# Patient Record
Sex: MALE | Race: WHITE | Employment: OTHER | URBAN - NONMETROPOLITAN AREA
[De-identification: names, ages, dates, MRNs, and addresses within clinical notes are randomized per-mention and may not be internally consistent; named-entity substitution may affect disease eponyms.]

---

## 2018-09-21 ENCOUNTER — HOSPITAL ENCOUNTER (OUTPATIENT)
Dept: LAB | Age: 23
Setting detail: SPECIMEN
Discharge: HOME | End: 2018-09-21

## 2018-09-21 ENCOUNTER — OFFICE VISIT (OUTPATIENT)
Dept: PRIMARY CARE CLINIC | Age: 23
End: 2018-09-21

## 2018-09-21 VITALS
WEIGHT: 139.6 LBS | TEMPERATURE: 97.5 F | HEIGHT: 65 IN | RESPIRATION RATE: 18 BRPM | OXYGEN SATURATION: 98 % | SYSTOLIC BLOOD PRESSURE: 118 MMHG | HEART RATE: 76 BPM | BODY MASS INDEX: 23.26 KG/M2 | DIASTOLIC BLOOD PRESSURE: 70 MMHG

## 2018-09-21 DIAGNOSIS — E04.9 ENLARGED THYROID: ICD-10-CM

## 2018-09-21 DIAGNOSIS — E04.9 ENLARGED THYROID: Primary | ICD-10-CM

## 2018-09-21 LAB — TSH SERPL DL<=0.05 MIU/L-ACNC: 1.05 MIU/L (ref 0.3–5)

## 2018-09-21 PROCEDURE — 36415 COLL VENOUS BLD VENIPUNCTURE: CPT

## 2018-09-21 PROCEDURE — 84443 ASSAY THYROID STIM HORMONE: CPT

## 2018-09-21 PROCEDURE — 99203 OFFICE O/P NEW LOW 30 MIN: CPT | Performed by: FAMILY MEDICINE

## 2018-09-21 ASSESSMENT — ENCOUNTER SYMPTOMS
DIARRHEA: 1
WHEEZING: 0
CHEST TIGHTNESS: 0
COUGH: 0
SHORTNESS OF BREATH: 0
BLOOD IN STOOL: 0

## 2018-09-21 NOTE — PROGRESS NOTES
[address] [place] [address] [place]  Dept: [phone]  Dept Fax: [phone]  Loc: [phone]    Jessee Rosenberg is a [employer] y.o. male who presents today for his medical conditions/complaints as noted below. Jessee Rosenberg is c/o of   Chief Complaint   Patient presents with    Thyroid Problem     stated about a weekago, unable to sleep, throat swelled up       HPI:     HPI Here today for concerns about his thyroid. He has been having issues with fatigue over the past 10 days. He reports that he has been working himself too hard and now \"his body is shutting down\". He has also been having issues with feeling like he couldn't eat or sleep. He lost 10 pounds in a week and his hair was falling out. His mom is worried that he could have hyperthyroidism because she has inna's disease. He was having some issues with heat intolerance and he was having any issues with palpitations as well. Past Medical History:   Diagnosis Date    Acne     Allergic rhinitis     Anterior cruciate ligament tear     Left knee.  Metacarpal bone fracture 2007    Right 3rd.  Traumatic injury 2009    Right arm laceration, median nerve laceration, brachial artery laceration with multiple surgeries. Social History   Substance Use Topics    Smoking status: Never Smoker    Smokeless tobacco: Never Used    Alcohol use No      No current outpatient prescriptions on file. No current facility-administered medications for this visit. Allergies   Allergen Reactions    Bee Venom      Stings. Uses EpiPen. Subjective:      Review of Systems   Constitutional: Positive for appetite change, fatigue and unexpected weight change (10 pound weight loss). Negative for activity change, chills and fever. Eyes: Negative for visual disturbance. Respiratory: Negative for cough, chest tightness, shortness of breath and wheezing. Cardiovascular: Positive for palpitations. Negative for chest pain and leg swelling. Gastrointestinal: Positive for diarrhea. Negative for blood in stool. Endocrine: Positive for heat intolerance. Genitourinary: Negative for difficulty urinating. Skin: Negative for rash (some that are coming and going). Neurological: Negative for dizziness, syncope, weakness, light-headedness and headaches. Psychiatric/Behavioral: Positive for agitation. The patient is nervous/anxious. Objective:     Physical Exam   Constitutional: He is oriented to person, place, and time. He appears well-developed and well-nourished. No distress. Eyes: Pupils are equal, round, and reactive to light. Conjunctivae and EOM are normal.   Neck: Normal range of motion. Neck supple. Thyromegaly present. Cardiovascular: Normal rate, regular rhythm, normal heart sounds and intact distal pulses. No murmur heard. Pulmonary/Chest: Effort normal and breath sounds normal. No respiratory distress. He has no wheezes. He has no rales. Musculoskeletal: Normal range of motion. He exhibits no edema. Lymphadenopathy:     He has no cervical adenopathy. Neurological: He is alert and oriented to person, place, and time. Skin: Skin is warm and dry. No rash noted. Psychiatric: His mood appears anxious. His speech is rapid and/or pressured. He is agitated. He expresses impulsivity. Nursing note and vitals reviewed. /70   Pulse 76   Temp 97.5 °F (36.4 °C)   Resp 18   Ht 5' 5\" (1.651 m)   Wt 139 lb 9.6 oz (63.3 kg)   SpO2 98%   BMI 23.23 kg/m²     Assessment:       Diagnosis Orders   1. Enlarged thyroid  TSH With Reflex Ft4             Plan:       Enlarged thyroid: new; I recommended checking a TSH which he is hesitant to do because he does not have insurance. I also recommended a possible ultrasound which he refused. He informed me that he also will not take prescription medication and he plans to treat this naturally if his thyroid is off in some way.  I advised

## 2018-09-27 ENCOUNTER — OFFICE VISIT (OUTPATIENT)
Dept: FAMILY MEDICINE CLINIC | Age: 23
End: 2018-09-27

## 2018-09-27 VITALS
HEIGHT: 66 IN | BODY MASS INDEX: 22.37 KG/M2 | TEMPERATURE: 97 F | WEIGHT: 139.2 LBS | HEART RATE: 73 BPM | DIASTOLIC BLOOD PRESSURE: 82 MMHG | OXYGEN SATURATION: 98 % | SYSTOLIC BLOOD PRESSURE: 120 MMHG

## 2018-09-27 DIAGNOSIS — G89.29 CHRONIC UPPER BACK PAIN: Primary | ICD-10-CM

## 2018-09-27 DIAGNOSIS — M54.9 CHRONIC UPPER BACK PAIN: Primary | ICD-10-CM

## 2018-09-27 DIAGNOSIS — J30.1 SEASONAL ALLERGIC RHINITIS DUE TO POLLEN: ICD-10-CM

## 2018-09-27 PROCEDURE — 99214 OFFICE O/P EST MOD 30 MIN: CPT | Performed by: FAMILY MEDICINE

## 2018-09-27 RX ORDER — BACLOFEN 10 MG/1
10 TABLET ORAL NIGHTLY PRN
Qty: 30 TABLET | Refills: 1 | Status: ON HOLD | OUTPATIENT
Start: 2018-09-27 | End: 2018-11-13 | Stop reason: HOSPADM

## 2018-09-27 ASSESSMENT — ENCOUNTER SYMPTOMS
SHORTNESS OF BREATH: 0
ABDOMINAL PAIN: 0
CHEST TIGHTNESS: 0
BOWEL INCONTINENCE: 0
COUGH: 0
WHEEZING: 0
NAUSEA: 0
DIARRHEA: 0
CONSTIPATION: 0
BACK PAIN: 1

## 2018-09-27 ASSESSMENT — PATIENT HEALTH QUESTIONNAIRE - PHQ9
SUM OF ALL RESPONSES TO PHQ9 QUESTIONS 1 & 2: 0
SUM OF ALL RESPONSES TO PHQ QUESTIONS 1-9: 0
SUM OF ALL RESPONSES TO PHQ QUESTIONS 1-9: 0
2. FEELING DOWN, DEPRESSED OR HOPELESS: 0
1. LITTLE INTEREST OR PLEASURE IN DOING THINGS: 0

## 2018-09-27 NOTE — PROGRESS NOTES
[address] [facility] [place]  Dept: [phone]  Dept Fax: [phone]  Loc: [phone]    Jessee Rosenberg is a [facility] y.o. male who presents today for his medical conditions/complaints as noted below. Jessee Rosenberg is c/o of   Chief Complaint   Patient presents with   [facility] Established New Doctor    Back Pain     upper back- right shoulder - underneath the shoulder blade     Allergies     seasonal        HPI:     Here today for back pain and seasonal allergies. Back Pain   This is a chronic problem. The current episode started more than 1 year ago (2009 after a really bad accident). The problem occurs constantly. The problem is unchanged. The pain is present in the thoracic spine. The quality of the pain is described as shooting and stabbing. The pain does not radiate. Associated symptoms include tingling (right arm due to traumatic injury) and weakness (in right wrist and hand). Pertinent negatives include no abdominal pain, bladder incontinence, bowel incontinence, chest pain, dysuria, fever, headaches, numbness or paresthesias. He has tried home exercises and NSAIDs (massage) for the symptoms. The treatment provided mild relief. He has tried icy hot, he has never tried a TENS unit. His back feels \"hot\" constantly. Allergies: worsening; he has issues with sinus congestion, cough and runny nose in the spring and fall. He uses claritin and zyrtec with only mild improvement. He has also used flonase with significant improvement. He does not use any of these regularly. Past Medical History:   Diagnosis Date    Acne     Allergic rhinitis     Anterior cruciate ligament tear     Left knee.  Metacarpal bone fracture 2007    Right 3rd.  Traumatic injury 2009    Right arm laceration, median nerve laceration, brachial artery laceration with multiple surgeries.           Social History   Substance Use Topics    Smoking status: Never Smoker    Smokeless tobacco: Never Used    Alcohol use No      Current Outpatient Prescriptions   Medication Sig Dispense Refill    baclofen (LIORESAL) 10 MG tablet Take 1 tablet by mouth nightly as needed (muscle spasm) 30 tablet 1     No current facility-administered medications for this visit. Allergies   Allergen Reactions    Bee Venom      Stings. Uses EpiPen. Subjective:      Review of Systems   Constitutional: Negative for activity change, appetite change, chills, fatigue and fever. HENT: Positive for congestion. Eyes: Negative for visual disturbance. Respiratory: Negative for cough, chest tightness, shortness of breath and wheezing. Cardiovascular: Negative for chest pain, palpitations and leg swelling. Gastrointestinal: Negative for abdominal pain, bowel incontinence, constipation, diarrhea and nausea. Genitourinary: Negative for bladder incontinence, difficulty urinating and dysuria. Musculoskeletal: Positive for back pain. Negative for arthralgias, gait problem, joint swelling, neck pain and neck stiffness. Skin: Negative for rash. Allergic/Immunologic: Positive for environmental allergies. Neurological: Positive for tingling (right arm due to traumatic injury) and weakness (in right wrist and hand). Negative for dizziness, syncope, light-headedness, numbness, headaches and paresthesias. Objective:     Physical Exam   Constitutional: He is oriented to person, place, and time. He appears well-developed and well-nourished. HENT:   Right Ear: Tympanic membrane, external ear and ear canal normal.   Left Ear: Tympanic membrane, external ear and ear canal normal.   Nose: Mucosal edema present. Right sinus exhibits no maxillary sinus tenderness and no frontal sinus tenderness. Left sinus exhibits no maxillary sinus tenderness and no frontal sinus tenderness. Mouth/Throat: Posterior oropharyngeal erythema present. Eyes: Pupils are equal, round, and reactive to light. Conjunctivae and EOM are normal.   Neck: Normal range of motion. Neck supple. Thyromegaly present. Cardiovascular: Normal rate, regular rhythm, normal heart sounds and intact distal pulses. No murmur heard. Pulmonary/Chest: Effort normal and breath sounds normal. No respiratory distress. He has no wheezes. Musculoskeletal:        Thoracic back: He exhibits spasm (significant spasm of trapezius muscles and paraspinous muscles). He exhibits normal range of motion, no tenderness, no bony tenderness and no deformity. Lymphadenopathy:     He has no cervical adenopathy. Neurological: He is alert and oriented to person, place, and time. Reflex Scores:       Bicep reflexes are 1+ on the right side and 2+ on the left side. Strength is normal in upper arms, but significantly decreased strength in the right hand and wrist   Skin: Skin is warm and dry. No rash noted. No erythema. Nursing note and vitals reviewed. /82 (Site: Left Upper Arm, Position: Sitting, Cuff Size: Medium Adult)   Pulse 73   Temp 97 °F (36.1 °C) (Tympanic)   Ht 5' 6\" (1.676 m)   Wt 139 lb 3.2 oz (63.1 kg)   SpO2 98%   BMI 22.47 kg/m²     Assessment:       Diagnosis Orders   1. Chronic upper back pain  [facility] [place]   2. Seasonal allergic rhinitis due to pollen               Plan:       Upper back pain: new; I explained that I do not like to treat back pain with narcotics. I recommended NSAIDs, heat, ice and back exercises. he was given back exercises to take home. I also recommended an occasional muscle relaxer at bedtime if he needs it. I also talked about the importance of good posture and staying active. I think he would really benefit from PT so I put in a referral and I encouraged him to discuss cost with them. I also suggested an OTC TENS unit. Allergies: worsening; I recommended he take flonase with his antihistamine to help control his symptoms.       Return if symptoms worsen or fail to improve. Orders Placed This Encounter   Procedures    [facility] [place]     Referral Priority:   Routine     Referral Type:   Eval and Treat     Referral Reason:   Specialty Services Required     Requested Specialty:   Physical Therapy     Number of Visits Requested:   1     Orders Placed This Encounter   Medications    baclofen (LIORESAL) 10 MG tablet     Sig: Take 1 tablet by mouth nightly as needed (muscle spasm)     Dispense:  30 tablet     Refill:  1       Patient given educational materials - see patient instructions. Discussed use, benefit, and side effects of prescribed medications. All patient questions answered. Pt voiced understanding. Reviewed health maintenance. Instructed to continue current medications, diet and exercise. Patient agreed with treatment plan. Follow up as directed.      Electronically signed by Vonda Cedeno MD on 9/27/2018 at 5:11 PM

## 2018-11-09 ENCOUNTER — HOSPITAL ENCOUNTER (EMERGENCY)
Age: 23
Discharge: PSYCHIATRIC OR SUBSTANCE USE FACILITY | End: 2018-11-10
Attending: EMERGENCY MEDICINE

## 2018-11-09 DIAGNOSIS — F29 PSYCHOSIS, UNSPECIFIED PSYCHOSIS TYPE (HCC): Primary | ICD-10-CM

## 2018-11-09 LAB
-: ABNORMAL
ABSOLUTE EOS #: 0 K/UL (ref 0–0.4)
ABSOLUTE IMMATURE GRANULOCYTE: ABNORMAL K/UL (ref 0–0.3)
ABSOLUTE LYMPH #: 1.4 K/UL (ref 1–4.8)
ABSOLUTE MONO #: 0.9 K/UL (ref 0.1–1.2)
ACETAMINOPHEN LEVEL: <5 UG/ML (ref 10–30)
AMORPHOUS: ABNORMAL
AMPHETAMINE SCREEN URINE: NEGATIVE
ANION GAP SERPL CALCULATED.3IONS-SCNC: 21 MMOL/L (ref 9–17)
BACTERIA: ABNORMAL
BARBITURATE SCREEN URINE: NEGATIVE
BASOPHILS # BLD: 0 % (ref 0–2)
BASOPHILS ABSOLUTE: 0 K/UL (ref 0–0.2)
BENZODIAZEPINE SCREEN, URINE: NEGATIVE
BILIRUBIN URINE: ABNORMAL
BUN BLDV-MCNC: 20 MG/DL (ref 6–20)
BUN/CREAT BLD: 18 (ref 9–20)
BUPRENORPHINE URINE: NEGATIVE
CALCIUM SERPL-MCNC: 9.5 MG/DL (ref 8.6–10.4)
CANNABINOID SCREEN URINE: POSITIVE
CASTS UA: ABNORMAL /LPF (ref 0–2)
CHLORIDE BLD-SCNC: 98 MMOL/L (ref 98–107)
CO2: 21 MMOL/L (ref 20–31)
COCAINE METABOLITE, URINE: NEGATIVE
COLOR: ABNORMAL
COMMENT UA: ABNORMAL
CREAT SERPL-MCNC: 1.13 MG/DL (ref 0.7–1.2)
CRYSTALS, UA: ABNORMAL /HPF
DIFFERENTIAL TYPE: ABNORMAL
EOSINOPHILS RELATIVE PERCENT: 0 % (ref 1–8)
EPITHELIAL CELLS UA: ABNORMAL /HPF (ref 0–5)
ETHANOL PERCENT: NORMAL %
ETHANOL: <10 MG/DL
GFR AFRICAN AMERICAN: >60 ML/MIN
GFR NON-AFRICAN AMERICAN: >60 ML/MIN
GFR SERPL CREATININE-BSD FRML MDRD: ABNORMAL ML/MIN/{1.73_M2}
GFR SERPL CREATININE-BSD FRML MDRD: ABNORMAL ML/MIN/{1.73_M2}
GLUCOSE BLD-MCNC: 99 MG/DL (ref 70–99)
GLUCOSE URINE: NEGATIVE
HCT VFR BLD CALC: 49.1 % (ref 41–53)
HEMOGLOBIN: 16.2 G/DL (ref 13.5–17.5)
IMMATURE GRANULOCYTES: ABNORMAL %
KETONES, URINE: ABNORMAL
LEUKOCYTE ESTERASE, URINE: NEGATIVE
LYMPHOCYTES # BLD: 13 % (ref 15–43)
MCH RBC QN AUTO: 28.9 PG (ref 26–34)
MCHC RBC AUTO-ENTMCNC: 33.1 G/DL (ref 31–37)
MCV RBC AUTO: 87.3 FL (ref 80–100)
MDMA URINE: ABNORMAL
METHADONE SCREEN, URINE: NEGATIVE
METHAMPHETAMINE, URINE: NEGATIVE
MONOCYTES # BLD: 9 % (ref 6–14)
MUCUS: ABNORMAL
NITRITE, URINE: NEGATIVE
NRBC AUTOMATED: ABNORMAL PER 100 WBC
OPIATES, URINE: NEGATIVE
OTHER OBSERVATIONS UA: ABNORMAL
OXYCODONE SCREEN URINE: NEGATIVE
PDW BLD-RTO: 15.4 % (ref 11–14.5)
PH UA: 6 (ref 5–6)
PHENCYCLIDINE, URINE: NEGATIVE
PLATELET # BLD: 217 K/UL (ref 140–450)
PLATELET ESTIMATE: ABNORMAL
PMV BLD AUTO: 9.4 FL (ref 6–12)
POTASSIUM SERPL-SCNC: 3.6 MMOL/L (ref 3.7–5.3)
PROPOXYPHENE, URINE: NEGATIVE
PROTEIN UA: ABNORMAL
RBC # BLD: 5.63 M/UL (ref 4.5–5.9)
RBC # BLD: ABNORMAL 10*6/UL
RBC UA: ABNORMAL /HPF (ref 0–4)
RENAL EPITHELIAL, UA: ABNORMAL /HPF
SALICYLATE LEVEL: <1 MG/DL (ref 3–10)
SEG NEUTROPHILS: 78 % (ref 44–74)
SEGMENTED NEUTROPHILS ABSOLUTE COUNT: 8.3 K/UL (ref 1.8–7.7)
SODIUM BLD-SCNC: 140 MMOL/L (ref 135–144)
SPECIFIC GRAVITY UA: 1.03 (ref 1.01–1.02)
TEST INFORMATION: ABNORMAL
TRICHOMONAS: ABNORMAL
TRICYCLIC ANTIDEPRESSANTS, UR: NEGATIVE
TURBIDITY: ABNORMAL
URINE HGB: NEGATIVE
UROBILINOGEN, URINE: NORMAL
WBC # BLD: 10.7 K/UL (ref 3.5–11)
WBC # BLD: ABNORMAL 10*3/UL
WBC UA: ABNORMAL /HPF (ref 0–4)
YEAST: ABNORMAL

## 2018-11-09 PROCEDURE — 80307 DRUG TEST PRSMV CHEM ANLYZR: CPT

## 2018-11-09 PROCEDURE — 81001 URINALYSIS AUTO W/SCOPE: CPT

## 2018-11-09 PROCEDURE — 85025 COMPLETE CBC W/AUTO DIFF WBC: CPT

## 2018-11-09 PROCEDURE — 36415 COLL VENOUS BLD VENIPUNCTURE: CPT

## 2018-11-09 PROCEDURE — 99285 EMERGENCY DEPT VISIT HI MDM: CPT

## 2018-11-09 PROCEDURE — 80048 BASIC METABOLIC PNL TOTAL CA: CPT

## 2018-11-09 PROCEDURE — G0480 DRUG TEST DEF 1-7 CLASSES: HCPCS

## 2018-11-09 PROCEDURE — 80306 DRUG TEST PRSMV INSTRMNT: CPT

## 2018-11-10 ENCOUNTER — HOSPITAL ENCOUNTER (INPATIENT)
Age: 23
LOS: 3 days | Discharge: HOME | DRG: 885 | End: 2018-11-13
Attending: PSYCHIATRY & NEUROLOGY | Admitting: PSYCHIATRY & NEUROLOGY

## 2018-11-10 VITALS
SYSTOLIC BLOOD PRESSURE: 149 MMHG | OXYGEN SATURATION: 100 % | HEART RATE: 81 BPM | DIASTOLIC BLOOD PRESSURE: 81 MMHG | BODY MASS INDEX: 23.32 KG/M2 | WEIGHT: 140 LBS | TEMPERATURE: 98.7 F | HEIGHT: 65 IN | RESPIRATION RATE: 16 BRPM

## 2018-11-10 PROBLEM — F12.20 CANNABIS DEPENDENCE (HCC): Status: ACTIVE | Noted: 2018-11-10

## 2018-11-10 PROBLEM — F31.11 BIPOLAR 1 DISORDER, MANIC, MILD (HCC): Status: ACTIVE | Noted: 2018-11-10

## 2018-11-10 PROBLEM — F31.2 SEVERE MANIC BIPOLAR 1 DISORDER WITH PSYCHOTIC BEHAVIOR (HCC): Status: ACTIVE | Noted: 2018-11-10

## 2018-11-10 PROCEDURE — 6370000000 HC RX 637 (ALT 250 FOR IP): Performed by: PSYCHIATRY & NEUROLOGY

## 2018-11-10 PROCEDURE — 6370000000 HC RX 637 (ALT 250 FOR IP): Performed by: NURSE PRACTITIONER

## 2018-11-10 PROCEDURE — 1240000000 HC EMOTIONAL WELLNESS R&B

## 2018-11-10 PROCEDURE — 6360000002 HC RX W HCPCS: Performed by: NURSE PRACTITIONER

## 2018-11-10 PROCEDURE — 6360000002 HC RX W HCPCS: Performed by: PSYCHIATRY & NEUROLOGY

## 2018-11-10 PROCEDURE — 80076 HEPATIC FUNCTION PANEL: CPT

## 2018-11-10 PROCEDURE — 90792 PSYCH DIAG EVAL W/MED SRVCS: CPT | Performed by: NURSE PRACTITIONER

## 2018-11-10 RX ORDER — MAGNESIUM HYDROXIDE/ALUMINUM HYDROXICE/SIMETHICONE 120; 1200; 1200 MG/30ML; MG/30ML; MG/30ML
30 SUSPENSION ORAL EVERY 6 HOURS PRN
Status: DISCONTINUED | OUTPATIENT
Start: 2018-11-10 | End: 2018-11-13 | Stop reason: HOSPADM

## 2018-11-10 RX ORDER — HALOPERIDOL 5 MG/ML
5 INJECTION INTRAMUSCULAR EVERY 6 HOURS PRN
Status: DISCONTINUED | OUTPATIENT
Start: 2018-11-10 | End: 2018-11-13 | Stop reason: HOSPADM

## 2018-11-10 RX ORDER — DIVALPROEX SODIUM 500 MG/1
1000 TABLET, EXTENDED RELEASE ORAL DAILY
Status: DISCONTINUED | OUTPATIENT
Start: 2018-11-10 | End: 2018-11-13 | Stop reason: HOSPADM

## 2018-11-10 RX ORDER — DIPHENHYDRAMINE HYDROCHLORIDE 50 MG/ML
50 INJECTION INTRAMUSCULAR; INTRAVENOUS EVERY 6 HOURS PRN
Status: DISCONTINUED | OUTPATIENT
Start: 2018-11-10 | End: 2018-11-13 | Stop reason: HOSPADM

## 2018-11-10 RX ORDER — BENZTROPINE MESYLATE 1 MG/ML
2 INJECTION INTRAMUSCULAR; INTRAVENOUS 2 TIMES DAILY PRN
Status: DISCONTINUED | OUTPATIENT
Start: 2018-11-10 | End: 2018-11-13 | Stop reason: HOSPADM

## 2018-11-10 RX ORDER — HALOPERIDOL 5 MG/ML
5 INJECTION INTRAMUSCULAR EVERY 6 HOURS PRN
Status: DISCONTINUED | OUTPATIENT
Start: 2018-11-10 | End: 2018-11-10

## 2018-11-10 RX ORDER — NICOTINE 21 MG/24HR
1 PATCH, TRANSDERMAL 24 HOURS TRANSDERMAL DAILY
Status: DISCONTINUED | OUTPATIENT
Start: 2018-11-10 | End: 2018-11-12

## 2018-11-10 RX ORDER — TRAZODONE HYDROCHLORIDE 50 MG/1
50 TABLET ORAL NIGHTLY PRN
Status: DISCONTINUED | OUTPATIENT
Start: 2018-11-10 | End: 2018-11-13 | Stop reason: HOSPADM

## 2018-11-10 RX ORDER — HYDROXYZINE 50 MG/1
50 TABLET, FILM COATED ORAL 3 TIMES DAILY PRN
Status: DISCONTINUED | OUTPATIENT
Start: 2018-11-10 | End: 2018-11-13 | Stop reason: HOSPADM

## 2018-11-10 RX ORDER — ACETAMINOPHEN 325 MG/1
650 TABLET ORAL EVERY 4 HOURS PRN
Status: DISCONTINUED | OUTPATIENT
Start: 2018-11-10 | End: 2018-11-13 | Stop reason: HOSPADM

## 2018-11-10 RX ORDER — DIPHENHYDRAMINE HYDROCHLORIDE 50 MG/ML
25 INJECTION INTRAMUSCULAR; INTRAVENOUS EVERY 6 HOURS PRN
Status: DISCONTINUED | OUTPATIENT
Start: 2018-11-10 | End: 2018-11-10

## 2018-11-10 RX ADMIN — HYDROXYZINE HYDROCHLORIDE 50 MG: 50 TABLET, FILM COATED ORAL at 20:18

## 2018-11-10 RX ADMIN — NICOTINE POLACRILEX 2 MG: 2 GUM, CHEWING BUCCAL at 17:55

## 2018-11-10 RX ADMIN — NICOTINE POLACRILEX 2 MG: 2 GUM, CHEWING BUCCAL at 14:51

## 2018-11-10 RX ADMIN — HALOPERIDOL LACTATE 5 MG: 5 INJECTION, SOLUTION INTRAMUSCULAR at 19:32

## 2018-11-10 RX ADMIN — TRAZODONE HYDROCHLORIDE 50 MG: 50 TABLET ORAL at 20:18

## 2018-11-10 RX ADMIN — HYDROXYZINE HYDROCHLORIDE 50 MG: 50 TABLET, FILM COATED ORAL at 14:48

## 2018-11-10 RX ADMIN — HALOPERIDOL LACTATE 5 MG: 5 INJECTION, SOLUTION INTRAMUSCULAR at 12:30

## 2018-11-10 RX ADMIN — DIVALPROEX SODIUM 1000 MG: 500 TABLET, EXTENDED RELEASE ORAL at 14:48

## 2018-11-10 RX ADMIN — DIPHENHYDRAMINE HYDROCHLORIDE 25 MG: 50 INJECTION, SOLUTION INTRAMUSCULAR; INTRAVENOUS at 12:30

## 2018-11-10 RX ADMIN — DIPHENHYDRAMINE HYDROCHLORIDE 50 MG: 50 INJECTION, SOLUTION INTRAMUSCULAR; INTRAVENOUS at 19:33

## 2018-11-10 ASSESSMENT — SLEEP AND FATIGUE QUESTIONNAIRES: DO YOU HAVE DIFFICULTY SLEEPING: YES

## 2018-11-10 ASSESSMENT — LIFESTYLE VARIABLES: HISTORY_ALCOHOL_USE: NO

## 2018-11-10 NOTE — ED NOTES
Patient's mother and sister arrive back to ED. Both are allowed back to the room with patient after getting permission from patient for them to come back to room with him.      Marylin Domínguez, PEDRO  11/09/18 2003

## 2018-11-10 NOTE — ED NOTES
Writer checks on patient. Patient is standing at bedside, writing on the paper he was given.      Narcisa Venegas RN  11/09/18 5667

## 2018-11-10 NOTE — ED PROVIDER NOTES
ADDENDUM:      Care of this patient was assumed from Dr. Areln Chin. The patient was seen for Manic Behavior  . The patient's initial evaluation and plan have been discussed with the prior provider who initially evaluated the patient. Nursing Notes, Past Medical Hx, Past Surgical Hx, Social Hx, Family Hx, Medications and Allergies, and  were all reviewed. Diagnostic Results     EKG   None    RADIOLOGY:  No results found.       LABS:   Results for orders placed or performed during the hospital encounter of 11/09/18   CBC Auto Differential   Result Value Ref Range    WBC 10.7 3.5 - 11.0 k/uL    RBC 5.63 4.5 - 5.9 m/uL    Hemoglobin 16.2 13.5 - 17.5 g/dL    Hematocrit 49.1 41 - 53 %    MCV 87.3 80 - 100 fL    MCH 28.9 26 - 34 pg    MCHC 33.1 31 - 37 g/dL    RDW 15.4 (H) 11.0 - 14.5 %    Platelets 939 957 - 760 k/uL    MPV 9.4 6.0 - 12.0 fL    NRBC Automated NOT REPORTED per 100 WBC    Differential Type NOT REPORTED     Immature Granulocytes NOT REPORTED 0 %    Absolute Immature Granulocyte NOT REPORTED 0.00 - 0.30 k/uL    WBC Morphology NOT REPORTED     RBC Morphology NOT REPORTED     Platelet Estimate NOT REPORTED     Seg Neutrophils 78 (H) 44 - 74 %    Lymphocytes 13 (L) 15 - 43 %    Monocytes 9 6 - 14 %    Eosinophils % 0 (L) 1 - 8 %    Basophils 0 0 - 2 %    Segs Absolute 8.30 (H) 1.8 - 7.7 k/uL    Absolute Lymph # 1.40 1.0 - 4.8 k/uL    Absolute Mono # 0.90 0.1 - 1.2 k/uL    Absolute Eos # 0.00 0.0 - 0.4 k/uL    Basophils # 0.00 0.0 - 0.2 k/uL   Basic Metabolic Panel   Result Value Ref Range    Glucose 99 70 - 99 mg/dL    BUN 20 6 - 20 mg/dL    CREATININE 1.13 0.70 - 1.20 mg/dL    Bun/Cre Ratio 18 9 - 20    Calcium 9.5 8.6 - 10.4 mg/dL    Sodium 140 135 - 144 mmol/L    Potassium 3.6 (L) 3.7 - 5.3 mmol/L    Chloride 98 98 - 107 mmol/L    CO2 21 20 - 31 mmol/L    Anion Gap 21 (H) 9 - 17 mmol/L    GFR Non-African American >60 >60 mL/min    GFR African American >60 >60 mL/min    GFR Comment          GFR Staging NOT REPORTED    Urine Drug Screen   Result Value Ref Range    Amphetamine Screen, Ur NEGATIVE NEG    Barbiturate Screen, Ur NEGATIVE NEG    Benzodiazepine Screen, Urine NEGATIVE NEG    Cocaine Metabolite, Urine NEGATIVE NEG    Methadone Screen, Urine NEGATIVE NEG    Opiates, Urine NEGATIVE NEG    Phencyclidine, Urine NEGATIVE NEG    Propoxyphene, Urine NEGATIVE NEG    Cannabinoid Scrn, Ur POSITIVE (A) NEG    Oxycodone Screen, Ur NEGATIVE NEG    Methamphetamine, Urine NEGATIVE NEG    Tricyclic Antidepressants, Urine NEGATIVE NEG    MDMA, Urine NOT REPORTED NEG    Buprenorphine Urine NEGATIVE NEG    Test Information       The following threshold concentrations are established for the drug assays:   Ethanol   Result Value Ref Range    Ethanol <10 <10 mg/dL    Ethanol percent NOT REPORTED %   Acetaminophen Level   Result Value Ref Range    Acetaminophen Level <5 (L) 10 - 30 ug/mL   Salicylate   Result Value Ref Range    Salicylate Lvl <1 (L) 3 - 10 mg/dL   Urinalysis Reflex to Culture   Result Value Ref Range    Color, UA NOT REPORTED YEL    Turbidity UA NOT REPORTED CLEAR    Glucose, Ur NEGATIVE NEG    Bilirubin Urine 2+ (A) NEG    Ketones, Urine 3+ (A) NEG    Specific Gravity, UA 1.030 (H) 1.010 - 1.025    Urine Hgb NEGATIVE NEG    pH, UA 6.0 5.0 - 6.0    Protein, UA 1+ (A) NEG    Urobilinogen, Urine Normal NORM    Nitrite, Urine NEGATIVE NEG    Leukocyte Esterase, Urine NEGATIVE NEG    Urinalysis Comments NOT REPORTED    Microscopic Urinalysis   Result Value Ref Range    -          WBC, UA 0 TO 4 0 - 4 /HPF    RBC, UA None 0 - 4 /HPF    Casts UA NOT REPORTED 0 - 2 /LPF    Crystals UA NOT REPORTED NONE /HPF    Epithelial Cells UA 0 TO 4 0 - 5 /HPF    Renal Epithelial, Urine NOT REPORTED 0 /HPF    Bacteria, UA TRACE (A) NONE    Mucus, UA 4+ (A) NONE    Trichomonas, UA NOT REPORTED NONE    Amorphous, UA NOT REPORTED NONE    Other Observations UA NOT REPORTED NREQ    Yeast, UA NOT REPORTED NONE       RECENT VITALS:  BP: (!) 159/81, Temp: 98.7 °F (37.1 °C), Pulse: 77, Resp: 15     ED Course     The patient was given the following medications:  No orders of the defined types were placed in this encounter. Medical Decision Making      Case signed out to me by Dr. Nereida Delvalle. The patient presented to the emergency department with family members for concerns of not sleeping and other manic behavior. When I speak with the patient he is unable to clearly answer who he is or where he is. He has very tangential thoughts and flight of ideas. He states that he cannot see the numbers on the clock because there are a lot of background numbers as well. He exhibits features of psychosis and yamila. Other diagnosis to psychiatric conditions however family members reported that he had a similar episode 6 weeks ago that resolved. The patient was placed under a pink slip by Dr. Nereida Delvalle for psychosis. The [facility]  screener agrees with the patient is psychotic and would benefit from psychiatric admission. I discussed the case with Dr. Ivan Dye at 20:17 who was kind enough to accept the patient to his service. There was a lengthy wait and getting a bed assignment as well as ambulance transport. The patient has been agreeable. I have evaluated many times. He is nontoxic appearing in no apparent distress his thoughts are tangential he states that he has not slept in 4 days. He is very cooperative and easily to be redirected when necessary. Disposition     FINAL IMPRESSION      1. Psychosis, unspecified psychosis type [facility])          DISPOSITION/PLAN   DISPOSITION Decision To Transfer 11/09/2018 08:22:36 PM      PATIENT REFERRED TO:  No follow-up provider specified.     DISCHARGE MEDICATIONS:  New Prescriptions    No medications on file             (Please note that portions of this note were completed with a voice recognition program.  Efforts were made to edit the dictations but occasionally words are mis-transcribed.)    Katia Pennington MD, [address]  Attending Emergency Medicine Physician                  Katia Pennington MD  11/10/18 0309      9:82 AM: [place] [employer] have arrived to the emergency department. They are requesting to speak to this patient is seems that he has called 911 multiple times and hung up. He did answer some of their callbacks. [place] back to. Discussing this with the patient right now as he is interfering with 911 resources.      Katia Pennington MD  11/10/18 3879

## 2018-11-10 NOTE — BH NOTE
Psychiatric Admission Note Nurse Practitioner     Soco Drake is a 20 y.o. male who was admitted from the [facility] ER. Patient is acting in a manic way over the past 3-4 days. He has not slept much. He has not making sense according to his friend. He is getting angry very easily according to his mother. This morning he had an episode where he was yelling repeatedly going back and forth asking his mom the question \"is it you , is it me, is it oh, is it me, yes it's me. .\" No psychiatric history however about 6 weeks ago he had a similar manic type episode according to his friend that resolved on its own. Past Psychiatric History   Patient Denies any history of outpt mental health care. Denied history of psychiatric inpatient hospitalizations. Denied history of suicide attempts. History of Substance Abuse     SARAI    Family History of psychiatric disorders    Family history: SARAI SARAI      Medical History   Allergies:  Bee venom   Past Medical History:   Diagnosis Date    Acne     Allergic rhinitis     Anterior cruciate ligament tear     Left knee.  Metacarpal bone fracture 2007    Right 3rd.  Traumatic injury 2009    Right arm laceration, median nerve laceration, brachial artery laceration with multiple surgeries. Past Surgical History:   Procedure Laterality Date    ANTERIOR CRUCIATE LIGAMENT REPAIR Right 01/2016    but retore it again in October 2016    ARM SURGERY Right 11/28/2009    Compartment syndrome right forearm, status post primary closure of fasciotomy right forearm.  ARTERY SURGERY Right 11/25/2009    Exploration right axillary brachial artery, right axillary to brachial artery bypass and right axillary to brachial artery venous bypass with greater saphenous vein.  FASCIOTOMY Right 11/25/2009    Fasciotomy right volar forearm.     NERVE SURGERY Right 11/25/2009    Repair of right median nerve, right ulnar nerve, right musculocutaneous nerve and repair of triceps muscle, Dr. Erinn Oscar, Dr. Juliana St. SOCIAL HISTORY  SARAI      Mental Status  Pt. was alert, hyperverbal, manic, oriented and inappropriate. Appearance and hygiene were age appropriate. Mood was euphoric. Affect was labile, inappropriately animated Thought process was disorganized. Patient denied any hallucinations or paranoia. Patient denied suicidal ideations. Patient denied homicidal ideations . Patient's gross cognitive functions were impaired. Insight and judgement were poor. Both recent and remote memory were impaired. Psychomotor status was without abnormality, agitated and slowed     Diagnostic Impression    Bipolar I, Manic Episode      Medications   nicotine  1 patch Transdermal Daily     hydrOXYzine, traZODone, nicotine polacrilex    Treatment Plan:    1. Admit to inpatient psychiatric treatment  2. Supportive therapy with medication management. Reviewed risks and benefits as well as potential side effects with patient. 3. Therapeutic activities and groups  4.  Follow up at [facility] after symptoms stabilized    Estimated length of stay: 5-7 days    Shayy Maldonado APRN - CNP  Psychiatric Advanced Practice Nurse

## 2018-11-11 LAB
ALBUMIN SERPL-MCNC: 5 G/DL (ref 3.5–5.2)
ALBUMIN/GLOBULIN RATIO: ABNORMAL (ref 1–2.5)
ALP BLD-CCNC: 63 U/L (ref 40–129)
ALT SERPL-CCNC: 82 U/L (ref 5–41)
AST SERPL-CCNC: 207 U/L
BILIRUB SERPL-MCNC: 1.67 MG/DL (ref 0.3–1.2)
BILIRUBIN DIRECT: 0.37 MG/DL
BILIRUBIN, INDIRECT: 1.3 MG/DL (ref 0–1)
CHOLESTEROL/HDL RATIO: 3.3
CHOLESTEROL: 241 MG/DL
ESTIMATED AVERAGE GLUCOSE: 88 MG/DL
GLOBULIN: ABNORMAL G/DL (ref 1.5–3.8)
HBA1C MFR BLD: 4.7 % (ref 4–6)
HDLC SERPL-MCNC: 73 MG/DL
LDL CHOLESTEROL: 143 MG/DL (ref 0–130)
THYROXINE, FREE: 1.92 NG/DL (ref 0.93–1.7)
TOTAL PROTEIN: 7.9 G/DL (ref 6.4–8.3)
TRIGL SERPL-MCNC: 125 MG/DL
TSH SERPL DL<=0.05 MIU/L-ACNC: 1.29 MIU/L (ref 0.3–5)
VLDLC SERPL CALC-MCNC: ABNORMAL MG/DL (ref 1–30)

## 2018-11-11 PROCEDURE — 84443 ASSAY THYROID STIM HORMONE: CPT

## 2018-11-11 PROCEDURE — 6370000000 HC RX 637 (ALT 250 FOR IP): Performed by: PSYCHIATRY & NEUROLOGY

## 2018-11-11 PROCEDURE — 80061 LIPID PANEL: CPT

## 2018-11-11 PROCEDURE — 6370000000 HC RX 637 (ALT 250 FOR IP): Performed by: NURSE PRACTITIONER

## 2018-11-11 PROCEDURE — 83036 HEMOGLOBIN GLYCOSYLATED A1C: CPT

## 2018-11-11 PROCEDURE — 36415 COLL VENOUS BLD VENIPUNCTURE: CPT

## 2018-11-11 PROCEDURE — 1240000000 HC EMOTIONAL WELLNESS R&B

## 2018-11-11 PROCEDURE — 84439 ASSAY OF FREE THYROXINE: CPT

## 2018-11-11 PROCEDURE — 80076 HEPATIC FUNCTION PANEL: CPT

## 2018-11-11 PROCEDURE — 99232 SBSQ HOSP IP/OBS MODERATE 35: CPT | Performed by: PSYCHIATRY & NEUROLOGY

## 2018-11-11 RX ADMIN — NICOTINE POLACRILEX 2 MG: 2 GUM, CHEWING BUCCAL at 14:54

## 2018-11-11 RX ADMIN — HYDROXYZINE HYDROCHLORIDE 50 MG: 50 TABLET, FILM COATED ORAL at 20:51

## 2018-11-11 RX ADMIN — DIVALPROEX SODIUM 1000 MG: 500 TABLET, EXTENDED RELEASE ORAL at 09:05

## 2018-11-11 RX ADMIN — NICOTINE POLACRILEX 2 MG: 2 GUM, CHEWING BUCCAL at 10:54

## 2018-11-11 RX ADMIN — NICOTINE POLACRILEX 2 MG: 2 GUM, CHEWING BUCCAL at 18:10

## 2018-11-11 RX ADMIN — TRAZODONE HYDROCHLORIDE 50 MG: 50 TABLET ORAL at 20:51

## 2018-11-11 RX ADMIN — LURASIDONE HYDROCHLORIDE 40 MG: 40 TABLET, FILM COATED ORAL at 20:51

## 2018-11-11 RX ADMIN — NICOTINE POLACRILEX 2 MG: 2 GUM, CHEWING BUCCAL at 20:51

## 2018-11-11 ASSESSMENT — ENCOUNTER SYMPTOMS
BLOOD IN STOOL: 0
BACK PAIN: 0
SHORTNESS OF BREATH: 0
COUGH: 0
EYE ITCHING: 0
EYE REDNESS: 0
CHEST TIGHTNESS: 0
COLOR CHANGE: 0
SORE THROAT: 0
EYE PAIN: 0
ABDOMINAL PAIN: 0
NAUSEA: 0
RHINORRHEA: 0
DIARRHEA: 0
SINUS PAIN: 0
WHEEZING: 0
VOMITING: 0
CONSTIPATION: 0

## 2018-11-11 NOTE — H&P
HISTORY and [address]       NAME:  Roxann Lux  MRN: [id number]   YOB: 1995   Date: 11/11/2018   Age: 20 y.o. Gender: male     COMPLAINT AND PRESENT HISTORY:      Roxann Lux is a 20 y.o.  male, admitted due to increasing yamila, bizarre behavior over past few weeks. Patient reports he was brought to emergency room by family due to them \"feeling like my behavior is changing\". Per chart review, patient's friend mentioned previous episode months ago where \"patient was paranoid, often spoke in third person, and seemed to have a different personality\". Patient denies thoughts to harm self or others, denies history of previous suicide attempts. Patient reports increased stress recently due to his boss, patient states he works for [employer], he is in management, also [employer] and does a lot for [employer]. In the past few weeks, patient states that sleep has been poor, appetite has been poor, energy level has been very high, focus/concentration has been decreased. Patient denies presence of auditory, visual or tactile hallucinations. Patient does reports history of marijuana and cocaine use in the past, denies current drug or alcohol use, toxicology positive for cannabinoids. Patient living situation is unclear, states he works for company as above. Patient claims not previously taking psychiatric medications. No somatic complaints, patient denies any fever/chills, chest pain, shortness of breath.      DIAGNOSTIC RESULTS   Labs:  CBC:   Recent Labs      11/09/18   1535   WBC  10.7   HGB  16.2   PLT  217     BMP:    Recent Labs      11/09/18   1535   NA  140   K  3.6*   CL  98   CO2  21   BUN  20   CREATININE  1.13   GLUCOSE  99     Hepatic:   Recent Labs      11/11/18   0733   AST  207*   ALT  82*   BILITOT  1.67*   ALKPHOS  63     Lipids:   Recent Labs      11/11/18   0733   CHOL  241*   HDL  73     Thyroid:   Recent Labs      11/11/18   0733   TSH  1.29

## 2018-11-11 NOTE — PROGRESS NOTES
Department of Psychiatry  Attending Progress Note  Chief Complaint: Severe manic bipolar 1 disorder with psychotic behavior ([address]     SUBJECTIVE: Patient reports that mood is fluctuating constantly, that his thoughts are racing, decreased need to sleep, flight of ideas, is extremely impulsive, people notice That he is hyper, and is also hearing  Auditory hallucinations of a group of people talking to him constantly. Patient continues to complain of racing thoughts driving feelings of anxiety. Denies side effects to medications. Reports feeling hopeless at times about situation and cannot contract for safety outside of hospital setting. Explored his feelings and concerns. Reassurance and support provided. Charting and medications reviewed. There is no identifiable safe alternative other than continued hospitalization. Plan is to add atypical antipsychotic to his regime       OBJECTIVE    Physical  /85   Pulse 113   Temp 98.7 °F (37.1 °C) (Oral)   Resp 16   Ht 5' 5\" (1.651 m)   Wt 139 lb 8 oz (63.3 kg)   SpO2 98%   BMI 23.21 kg/m²      Mental Status Evaluation:  Orientation: alertness: alert   Mood:.  euphoric and labile      Affect:  inappropriate, increased in intensity, labile and mood-incongruent      Appearance:  disheveled   Activity:  Restless & fidgety   Gait/Posture: Normal   Speech:  loud   Thought Process:  circumstantial and flight of ideas   Thought Content:  hallucinations and yamila   Sensorium:  person, place, time/date and situation   Cognition:  grossly intact   Memory: intact   Insight:  limited   Judgment: limited   Suicidal Ideations: denies suicidal ideation   Homicidal Ideations: Negative for homicidal ideation      Medication Side Effects: absent       Attention Span attention span appeared shorter than expected for age     Medications  Current Facility-Administered Medications   Medication Dose Route Frequency Provider Last Rate Last Dose    hydrOXYzine (ATARAX) tablet 50 mg  50

## 2018-11-12 LAB
VALPROIC ACID LEVEL: 68 UG/ML (ref 50–125)
VALPROIC DATE LAST DOSE: NORMAL
VALPROIC DOSE AMOUNT: 1000
VALPROIC TIME LAST DOSE: 905

## 2018-11-12 PROCEDURE — 99232 SBSQ HOSP IP/OBS MODERATE 35: CPT | Performed by: REGISTERED NURSE

## 2018-11-12 PROCEDURE — 6370000000 HC RX 637 (ALT 250 FOR IP): Performed by: PSYCHIATRY & NEUROLOGY

## 2018-11-12 PROCEDURE — 1240000000 HC EMOTIONAL WELLNESS R&B

## 2018-11-12 PROCEDURE — 36415 COLL VENOUS BLD VENIPUNCTURE: CPT

## 2018-11-12 PROCEDURE — 6370000000 HC RX 637 (ALT 250 FOR IP): Performed by: NURSE PRACTITIONER

## 2018-11-12 PROCEDURE — 80164 ASSAY DIPROPYLACETIC ACD TOT: CPT

## 2018-11-12 RX ADMIN — NICOTINE POLACRILEX 2 MG: 2 GUM, CHEWING BUCCAL at 15:26

## 2018-11-12 RX ADMIN — LURASIDONE HYDROCHLORIDE 40 MG: 40 TABLET, FILM COATED ORAL at 19:48

## 2018-11-12 RX ADMIN — NICOTINE POLACRILEX 2 MG: 2 GUM, CHEWING BUCCAL at 22:28

## 2018-11-12 RX ADMIN — NICOTINE POLACRILEX 2 MG: 2 GUM, CHEWING BUCCAL at 08:42

## 2018-11-12 RX ADMIN — DIVALPROEX SODIUM 1000 MG: 500 TABLET, EXTENDED RELEASE ORAL at 08:42

## 2018-11-12 NOTE — BH NOTE
Psychoeducation Group Note    Date: 11/12/18  Start Time: 1430  End Time: 6739    Number Participants in Group: 16/23    Goal:  Patient will demonstrate increased interpersonal interaction   Topic: COGNITIVE  SKILLS  GROUP: COMMUNICATION AND PROBLEM SOLVING TASK    Discipline Responsible:   OT  AT  [facility]. X RT  Other       Participation Level:     None  Minimal   X Active Listener X Interactive    Monopolizing         Participation Quality:   Appropriate  Inappropriate   X       Attentive        Intrusive   X       Sharing        Resistant   X       Supportive        Lethargic       Affective:   X Congruent  Incongruent  Blunted  Flat    Constricted  Anxious  Elated  Angry    Labile  Depressed  Other X BRIGHT       Cognitive:  X Alert X Oriented PPTP     Concentration X G  F  P   Attention Span X G  F  P   Short-Term Memory  G  F  P   Long-Term Memory  G  F  P   ProblemSolving/  Decision Making  G X F  P   Ability to Process  Information X G  F  P      Contributing Factors             Delusional             Hallucinating             Flight of Ideas             Other:       Modes of Intervention:  X Education X Support X Exploration   X Clarifying X Problem Solving  Confrontation   X Socialization  Limit Setting  Reality Testing   X Activity  Movement  Media    Other:            Response to Learning:  X Able to verbalize current knowledge/experience   X Able to verbalize/acknowledge new learning   X Able to retain information    Capable of insight    Able to change behavior   X Progressing to goal    Other:        Comments:

## 2018-11-12 NOTE — PLAN OF CARE
Problem: Altered Mood, Manic Behavior:  Goal: Ability to achieve adequate nutritional intake will improve  Ability to achieve adequate nutritional intake will improve   Outcome: Ongoing  [address] [place] [address]  Day 3 Interdisciplinary Treatment Plan NOTE    Review Date & Time: 11/12/2018 1310     Admission Type:   Admission Type: Voluntary    Reason for admission:  Reason for Admission: Bipolar Manic  Estimated Length of Stay: 5-7 days  Estimated Discharge Date Update: to be determined by physician    PATIENT STRENGTHS:  Patient Strengths Strengths:  (SARAI. During assessment PT would answer most questions asked by this SW by stating, \" depends on the parameters. \" When asked if he has a history of abuse he states, \"depends on the parameters, abuse depends on the eye of the . \" \"If you want the ri)  Patient Strengths and Limitations:Limitations:  (SARAI. During assessment PT would answer most questions asked by this SW by stating, \" depends on the parameters. \" When asked if he has a history of abuse he states, \"depends on the parameters, abuse depends on the eye of the . \" \"If you want the ri)  Addictive Behavior:Addictive Behavior  In the past 3 months, have you felt or has someone told you that you have a problem with:  :  (SARAI. During assessment PT would answer most questions asked by this SW by stating, \" depends on the parameters. \" When asked if he has a history of abuse he states, \"depends on the parameters, abuse depends on the eye of the . \" \"If you want the ri)  Do you have a history of Chemical Use?:  (SARAI. During assessment PT would answer most questions asked by this SW by stating, \" depends on the parameters. \" When asked if he has a history of abuse he states, \"depends on the parameters, abuse depends on the eye of the . \" \"If you want the ri)  Do you have a history of Alcohol Use?:  (SARAI.  During assessment PT would answer most questions asked by this SW by stating, \" depends self-control will improve  Ability to demonstrate self-control will improve   Outcome: Ongoing    Goal: Mood stable  Mood stable   Outcome: Ongoing    Goal: Patient specific goal  Patient specific goal   Outcome: Ongoing

## 2018-11-12 NOTE — PLAN OF CARE
Problem: Altered Mood, Manic Behavior:  Goal: Able to sleep  Able to sleep   Outcome: Ongoing    Goal: Able to verbalize decrease in frequency and intensity of racing thoughts  Able to verbalize decrease in frequency and intensity of racing thoughts   Outcome: Ongoing    Goal: Ability to disclose and discuss suicidal ideas will improve  Ability to disclose and discuss suicidal ideas will improve   Outcome: Ongoing    Goal: Absence of self-harm  Absence of self-harm   Outcome: Ongoing    Goal: Ability to achieve adequate nutritional intake will improve  Ability to achieve adequate nutritional intake will improve   Outcome: Ongoing    Goal: Ability to interact with others will improve  Ability to interact with others will improve   Outcome: Ongoing  Patient observed with bright affect and pleasant/cooperative upon approach. ADL's intact and appropriately dressed. Reports \"good\" sleep and appetite. 1:1 interaction provided. Appears anxious and intrusive at times. Redirection given and accepted. Med education provided and patient receptive. Patient denies SI, HI, and hallucinations. Writer explained unit routines and programming expectations. Encouraged to attend groups and socialize with peers. Educated patient on healthy lifestyle choices, positive coping skills, and relaxation techniques. Patient receptive. Patient is medication compliant, behavior control, and has remained free from harm. Writer will continue to monitor and intervene as needed. Safety maintained.     Goal: Ability to demonstrate self-control will improve  Ability to demonstrate self-control will improve   Outcome: Ongoing      Problem: Altered Mood, Psychotic Behavior:  Goal: Able to demonstrate trust by eating, participating in treatment and following staff's direction  Able to demonstrate trust by eating, participating in treatment and following staff's direction   Outcome: Ongoing    Goal: Able to verbalize decrease in frequency and intensity of

## 2018-11-12 NOTE — FLOWSHEET NOTE
*Patient participated in Spirituality Group       11/12/18 8123   Encounter Summary   Services provided to: Patient   Referral/Consult From: Rounding   Continue Visiting ([id number])   Complexity of Encounter Low   Length of Encounter 30 minutes   Spiritual Assessment Completed Yes   Spiritual/Zoroastrian   Type Spiritual support   Assessment Calm   Intervention Active listening   Outcome Receptive

## 2018-11-13 VITALS
OXYGEN SATURATION: 98 % | BODY MASS INDEX: 23.24 KG/M2 | HEART RATE: 83 BPM | SYSTOLIC BLOOD PRESSURE: 130 MMHG | RESPIRATION RATE: 14 BRPM | WEIGHT: 139.5 LBS | DIASTOLIC BLOOD PRESSURE: 69 MMHG | TEMPERATURE: 97.9 F | HEIGHT: 65 IN

## 2018-11-13 LAB
ALBUMIN SERPL-MCNC: 4.7 G/DL (ref 3.5–5.2)
ALBUMIN/GLOBULIN RATIO: ABNORMAL (ref 1–2.5)
ALP BLD-CCNC: 54 U/L (ref 40–129)
ALT SERPL-CCNC: 55 U/L (ref 5–41)
ANION GAP SERPL CALCULATED.3IONS-SCNC: 14 MMOL/L (ref 9–17)
AST SERPL-CCNC: 68 U/L
BILIRUB SERPL-MCNC: 0.86 MG/DL (ref 0.3–1.2)
BUN BLDV-MCNC: 14 MG/DL (ref 6–20)
BUN/CREAT BLD: ABNORMAL (ref 9–20)
CALCIUM SERPL-MCNC: 9.5 MG/DL (ref 8.6–10.4)
CHLORIDE BLD-SCNC: 97 MMOL/L (ref 98–107)
CO2: 24 MMOL/L (ref 20–31)
CREAT SERPL-MCNC: 0.92 MG/DL (ref 0.7–1.2)
GFR AFRICAN AMERICAN: >60 ML/MIN
GFR NON-AFRICAN AMERICAN: >60 ML/MIN
GFR SERPL CREATININE-BSD FRML MDRD: ABNORMAL ML/MIN/{1.73_M2}
GFR SERPL CREATININE-BSD FRML MDRD: ABNORMAL ML/MIN/{1.73_M2}
GLUCOSE BLD-MCNC: 90 MG/DL (ref 70–99)
POTASSIUM SERPL-SCNC: 4.7 MMOL/L (ref 3.7–5.3)
SODIUM BLD-SCNC: 135 MMOL/L (ref 135–144)
TOTAL PROTEIN: 7.4 G/DL (ref 6.4–8.3)

## 2018-11-13 PROCEDURE — 99239 HOSP IP/OBS DSCHRG MGMT >30: CPT | Performed by: REGISTERED NURSE

## 2018-11-13 PROCEDURE — 80053 COMPREHEN METABOLIC PANEL: CPT

## 2018-11-13 PROCEDURE — 36415 COLL VENOUS BLD VENIPUNCTURE: CPT

## 2018-11-13 PROCEDURE — 6370000000 HC RX 637 (ALT 250 FOR IP): Performed by: NURSE PRACTITIONER

## 2018-11-13 PROCEDURE — 5130000000 HC BRIDGE APPOINTMENT

## 2018-11-13 PROCEDURE — 6370000000 HC RX 637 (ALT 250 FOR IP): Performed by: PSYCHIATRY & NEUROLOGY

## 2018-11-13 RX ORDER — TRAZODONE HYDROCHLORIDE 50 MG/1
50 TABLET ORAL NIGHTLY PRN
Qty: 14 TABLET | Refills: 0 | Status: ON HOLD | OUTPATIENT
Start: 2018-11-13 | End: 2020-01-03 | Stop reason: HOSPADM

## 2018-11-13 RX ORDER — DIVALPROEX SODIUM 500 MG/1
1000 TABLET, EXTENDED RELEASE ORAL DAILY
Qty: 14 TABLET | Refills: 0 | Status: ON HOLD | OUTPATIENT
Start: 2018-11-14 | End: 2020-01-03 | Stop reason: HOSPADM

## 2018-11-13 RX ADMIN — DIVALPROEX SODIUM 1000 MG: 500 TABLET, EXTENDED RELEASE ORAL at 08:34

## 2018-11-13 RX ADMIN — NICOTINE POLACRILEX 2 MG: 2 GUM, CHEWING BUCCAL at 13:03

## 2018-11-13 NOTE — PLAN OF CARE
Problem: Altered Mood, Manic Behavior:  Goal: Absence of self-harm  Absence of self-harm   Outcome: Ongoing  Patient denies any thoughts of self harm. Denies hallucinations. Attends groups. 15 minute checks maintained for safety.

## 2018-11-13 NOTE — DISCHARGE SUMMARY
taking these medications    Details   divalproex (DEPAKOTE ER) 500 MG extended release tablet Take 2 tablets by mouth daily  Qty: 14 tablet, Refills: 0    Comments: Meds to bed with voucher      traZODone (DESYREL) 50 MG tablet Take 1 tablet by mouth nightly as needed for Sleep  Qty: 14 tablet, Refills: 0      lurasidone (LATUDA) 40 MG TABS tablet Take 1 tablet by mouth Daily with supper  Qty: 14 tablet, Refills: 0         STOP taking these medications       baclofen (LIORESAL) 10 MG tablet Comments:   Reason for Stopping:                  Discharge Exam:  Level of consciousness:  Within normal limits  Appearance: Street clothes, seated, with good grooming  Behavior/Motor: No abnormalities noted  Attitude toward examiner:  Cooperative, attentive, good eye contact  Speech:  spontaneous, normal rate, normal volume and well articulated  Mood:  euthymic  Affect:  mood congruent  Thought processes:  linear, goal directed and coherent  Thought content:  Homocidal ideation denies  Suicidal Ideation:  denies suicidal ideation  Delusions:  no evidence of delusions  Perceptual Disturbance:  denies any perceptual disturbance  Cognition:  In tact  Memory: age appropriate  Insight & Judgement: fair  Medication side effects:  denies     Disposition: home    Patient Instructions: Activity: activity as tolerated    Follow-up as scheduled with [facility]. Time Spent: 35 minutes    Engagement: Patient displayed a good level of engagement with the treatments offered during this admission.        Discharge planning, findings, and recommendations were discussed with the patient and the treatment team.  Signed:  Sun Betts   11/13/2018  12:49 PM

## 2018-11-13 NOTE — PLAN OF CARE
Psychotherapy Group Note    Date: 11/13/2018  Start Time: 11:00am  End Time: 11:30am    Number Participants in Group:  10    Goal:  Patient will demonstrate increased interpersonal interaction   Topic: [facility]    Discipline Responsible:   OT  AT X SW  Nsg.  RT  Other       Participation Level:     None  Minimal    Active Listener X Interactive    Monopolizing         Participation Quality:  X Appropriate  Inappropriate   X        Attentive        Intrusive   X        Sharing        Resistant   X        Supportive        Lethargic       Affective:   X  Congruent  Incongruent  Blunted  Flat    Constricted  Anxious  Elated  Angry    Labile  Depressed  Other         Cognitive:  X Alert X  Oriented PPTP     Concentration X G  F  P   Attention Span X G  F  P   Short-Term Memory X G  F  P   Long-Term Memory X G  F  P   ProblemSolving/  Decision Making X G  F  P   Ability to Process  Information X G  F  P     N/A  Contributing Factors             Delusional             Hallucinating             Flight of Ideas             Other:       Modes of Intervention:   Education X Support X Exploration    Clarifying X Problem Solving  Confrontation    Socialization  Limit Setting  Reality Testing    Activity  Movement  Media    Other:            Response to Learning:  X Able to verbalize current knowledge/experience   X Able to verbalize/acknowledge new learning   X Able to retain information   X Capable of insight   X Able to change behavior   X Progressing to goal    Other:        Comments:

## 2018-11-14 ENCOUNTER — TELEPHONE (OUTPATIENT)
Dept: FAMILY MEDICINE CLINIC | Age: 23
End: 2018-11-14

## 2019-12-03 ENCOUNTER — HOSPITAL ENCOUNTER (EMERGENCY)
Age: 24
Discharge: HOME | End: 2019-12-03
Attending: EMERGENCY MEDICINE
Payer: MEDICAID

## 2019-12-03 VITALS
WEIGHT: 130 LBS | BODY MASS INDEX: 18.61 KG/M2 | SYSTOLIC BLOOD PRESSURE: 115 MMHG | HEART RATE: 90 BPM | OXYGEN SATURATION: 99 % | TEMPERATURE: 98.5 F | RESPIRATION RATE: 19 BRPM | HEIGHT: 70 IN | DIASTOLIC BLOOD PRESSURE: 91 MMHG

## 2019-12-03 DIAGNOSIS — Z91.14 NONCOMPLIANCE WITH MEDICATION REGIMEN: ICD-10-CM

## 2019-12-03 DIAGNOSIS — F31.13 BIPOLAR DISORDER WITH SEVERE MANIA (HCC): Primary | ICD-10-CM

## 2019-12-03 LAB
-: ABNORMAL
ACETAMINOPHEN LEVEL: <5 UG/ML (ref 10–30)
ALBUMIN SERPL-MCNC: 5.1 G/DL (ref 3.5–5.2)
ALBUMIN/GLOBULIN RATIO: 2 (ref 1–2.5)
ALP BLD-CCNC: 48 U/L (ref 40–129)
ALT SERPL-CCNC: 52 U/L (ref 5–41)
AMORPHOUS: ABNORMAL
AMPHETAMINE SCREEN URINE: NEGATIVE
ANION GAP SERPL CALCULATED.3IONS-SCNC: 16 MMOL/L (ref 9–17)
AST SERPL-CCNC: 35 U/L
BACTERIA: ABNORMAL
BARBITURATE SCREEN URINE: NEGATIVE
BENZODIAZEPINE SCREEN, URINE: NEGATIVE
BILIRUB SERPL-MCNC: 0.87 MG/DL (ref 0.3–1.2)
BILIRUBIN URINE: ABNORMAL
BUN BLDV-MCNC: 17 MG/DL (ref 6–20)
BUN/CREAT BLD: ABNORMAL (ref 9–20)
BUPRENORPHINE URINE: ABNORMAL
CALCIUM SERPL-MCNC: 10 MG/DL (ref 8.6–10.4)
CANNABINOID SCREEN URINE: POSITIVE
CASTS UA: ABNORMAL /LPF (ref 0–2)
CHLORIDE BLD-SCNC: 100 MMOL/L (ref 98–107)
CO2: 22 MMOL/L (ref 20–31)
COCAINE METABOLITE, URINE: NEGATIVE
COLOR: ABNORMAL
CREAT SERPL-MCNC: 1.02 MG/DL (ref 0.7–1.2)
CRYSTALS, UA: ABNORMAL /HPF
EPITHELIAL CELLS UA: ABNORMAL /HPF (ref 0–5)
ETHANOL PERCENT: <0.01 %
ETHANOL: <10 MG/DL
GFR AFRICAN AMERICAN: >60 ML/MIN
GFR NON-AFRICAN AMERICAN: >60 ML/MIN
GFR SERPL CREATININE-BSD FRML MDRD: ABNORMAL ML/MIN/{1.73_M2}
GFR SERPL CREATININE-BSD FRML MDRD: ABNORMAL ML/MIN/{1.73_M2}
GLUCOSE BLD-MCNC: 97 MG/DL (ref 70–99)
GLUCOSE URINE: NEGATIVE
HCT VFR BLD CALC: 47.2 % (ref 40.7–50.3)
HEMOGLOBIN: 15.5 G/DL (ref 13–17)
KETONES, URINE: ABNORMAL
LEUKOCYTE ESTERASE, URINE: NEGATIVE
MCH RBC QN AUTO: 28.3 PG (ref 25.2–33.5)
MCHC RBC AUTO-ENTMCNC: 32.8 G/DL (ref 28.4–34.8)
MCV RBC AUTO: 86.1 FL (ref 82.6–102.9)
MDMA URINE: ABNORMAL
METHADONE SCREEN, URINE: NEGATIVE
METHAMPHETAMINE, URINE: ABNORMAL
MUCUS: ABNORMAL
NITRITE, URINE: NEGATIVE
NRBC AUTOMATED: 0 PER 100 WBC
OPIATES, URINE: NEGATIVE
OTHER OBSERVATIONS UA: ABNORMAL
OXYCODONE SCREEN URINE: NEGATIVE
PDW BLD-RTO: 13.6 % (ref 11.8–14.4)
PH UA: 5.5 (ref 5–8)
PHENCYCLIDINE, URINE: NEGATIVE
PLATELET # BLD: 244 K/UL (ref 138–453)
PMV BLD AUTO: 11.6 FL (ref 8.1–13.5)
POTASSIUM SERPL-SCNC: 4 MMOL/L (ref 3.7–5.3)
PROPOXYPHENE, URINE: ABNORMAL
PROTEIN UA: ABNORMAL
RBC # BLD: 5.48 M/UL (ref 4.21–5.77)
RBC UA: ABNORMAL /HPF (ref 0–2)
RENAL EPITHELIAL, UA: ABNORMAL /HPF
SALICYLATE LEVEL: <1 MG/DL (ref 3–10)
SODIUM BLD-SCNC: 138 MMOL/L (ref 135–144)
SPECIFIC GRAVITY UA: 1.04 (ref 1–1.03)
TEST INFORMATION: ABNORMAL
TOTAL PROTEIN: 7.7 G/DL (ref 6.4–8.3)
TOXIC TRICYCLIC SC,BLOOD: NEGATIVE
TRICHOMONAS: ABNORMAL
TRICYCLIC ANTIDEPRESSANTS, UR: ABNORMAL
TURBIDITY: ABNORMAL
URINE HGB: NEGATIVE
UROBILINOGEN, URINE: NORMAL
VALPROIC ACID LEVEL: <3 UG/ML (ref 50–125)
VALPROIC DATE LAST DOSE: ABNORMAL
VALPROIC DOSE AMOUNT: ABNORMAL
VALPROIC TIME LAST DOSE: ABNORMAL
WBC # BLD: 13 K/UL (ref 3.5–11.3)
WBC UA: ABNORMAL /HPF (ref 0–5)
YEAST: ABNORMAL

## 2019-12-03 PROCEDURE — 6370000000 HC RX 637 (ALT 250 FOR IP): Performed by: STUDENT IN AN ORGANIZED HEALTH CARE EDUCATION/TRAINING PROGRAM

## 2019-12-03 PROCEDURE — 87086 URINE CULTURE/COLONY COUNT: CPT

## 2019-12-03 PROCEDURE — 80164 ASSAY DIPROPYLACETIC ACD TOT: CPT

## 2019-12-03 PROCEDURE — 96372 THER/PROPH/DIAG INJ SC/IM: CPT

## 2019-12-03 PROCEDURE — 99283 EMERGENCY DEPT VISIT LOW MDM: CPT

## 2019-12-03 PROCEDURE — 6360000002 HC RX W HCPCS: Performed by: STUDENT IN AN ORGANIZED HEALTH CARE EDUCATION/TRAINING PROGRAM

## 2019-12-03 PROCEDURE — G0480 DRUG TEST DEF 1-7 CLASSES: HCPCS

## 2019-12-03 PROCEDURE — 81001 URINALYSIS AUTO W/SCOPE: CPT

## 2019-12-03 PROCEDURE — 80053 COMPREHEN METABOLIC PANEL: CPT

## 2019-12-03 PROCEDURE — 80307 DRUG TEST PRSMV CHEM ANLYZR: CPT

## 2019-12-03 PROCEDURE — 93005 ELECTROCARDIOGRAM TRACING: CPT | Performed by: STUDENT IN AN ORGANIZED HEALTH CARE EDUCATION/TRAINING PROGRAM

## 2019-12-03 PROCEDURE — 85027 COMPLETE CBC AUTOMATED: CPT

## 2019-12-03 RX ORDER — HALOPERIDOL 5 MG
10 TABLET ORAL ONCE
Status: DISCONTINUED | OUTPATIENT
Start: 2019-12-03 | End: 2019-12-04 | Stop reason: HOSPADM

## 2019-12-03 RX ORDER — HALOPERIDOL 5 MG/ML
10 INJECTION INTRAMUSCULAR ONCE
Status: COMPLETED | OUTPATIENT
Start: 2019-12-03 | End: 2019-12-03

## 2019-12-03 RX ORDER — DIVALPROEX SODIUM 500 MG/1
500 TABLET, EXTENDED RELEASE ORAL DAILY
Status: DISCONTINUED | OUTPATIENT
Start: 2019-12-03 | End: 2019-12-04 | Stop reason: HOSPADM

## 2019-12-03 RX ADMIN — DIVALPROEX SODIUM 500 MG: 500 TABLET, FILM COATED, EXTENDED RELEASE ORAL at 22:24

## 2019-12-03 RX ADMIN — HALOPERIDOL LACTATE 10 MG: 5 INJECTION, SOLUTION INTRAMUSCULAR at 21:04

## 2019-12-03 ASSESSMENT — ENCOUNTER SYMPTOMS
COLOR CHANGE: 0
ABDOMINAL PAIN: 0
CHEST TIGHTNESS: 0
COUGH: 0
SORE THROAT: 0
SINUS PAIN: 0
VOMITING: 0
ABDOMINAL DISTENTION: 0
SINUS PRESSURE: 0
BACK PAIN: 0
SHORTNESS OF BREATH: 0
WHEEZING: 0
NAUSEA: 0
DIARRHEA: 0

## 2019-12-04 LAB
CULTURE: NO GROWTH
Lab: NORMAL
SPECIMEN DESCRIPTION: NORMAL

## 2019-12-31 ENCOUNTER — HOSPITAL ENCOUNTER (INPATIENT)
Age: 24
LOS: 3 days | Discharge: HOME | DRG: 753 | End: 2020-01-03
Attending: PSYCHIATRY & NEUROLOGY | Admitting: PSYCHIATRY & NEUROLOGY
Payer: MEDICAID

## 2019-12-31 PROBLEM — F31.9 BIPOLAR 1 DISORDER (HCC): Status: ACTIVE | Noted: 2019-12-31

## 2019-12-31 PROCEDURE — 6370000000 HC RX 637 (ALT 250 FOR IP): Performed by: PSYCHIATRY & NEUROLOGY

## 2019-12-31 PROCEDURE — 1240000000 HC EMOTIONAL WELLNESS R&B

## 2019-12-31 RX ORDER — TRAZODONE HYDROCHLORIDE 50 MG/1
50 TABLET ORAL NIGHTLY PRN
Status: DISCONTINUED | OUTPATIENT
Start: 2019-12-31 | End: 2020-01-03 | Stop reason: HOSPADM

## 2019-12-31 RX ORDER — TRAZODONE HYDROCHLORIDE 50 MG/1
50 TABLET ORAL NIGHTLY PRN
Status: DISCONTINUED | OUTPATIENT
Start: 2020-01-01 | End: 2019-12-31

## 2019-12-31 RX ORDER — NICOTINE 21 MG/24HR
1 PATCH, TRANSDERMAL 24 HOURS TRANSDERMAL DAILY
Status: DISCONTINUED | OUTPATIENT
Start: 2020-01-01 | End: 2019-12-31

## 2019-12-31 RX ADMIN — TRAZODONE HYDROCHLORIDE 50 MG: 50 TABLET ORAL at 23:48

## 2020-01-01 PROCEDURE — 6370000000 HC RX 637 (ALT 250 FOR IP): Performed by: PSYCHIATRY & NEUROLOGY

## 2020-01-01 PROCEDURE — 1240000000 HC EMOTIONAL WELLNESS R&B

## 2020-01-01 PROCEDURE — 6370000000 HC RX 637 (ALT 250 FOR IP): Performed by: NURSE PRACTITIONER

## 2020-01-01 PROCEDURE — 90792 PSYCH DIAG EVAL W/MED SRVCS: CPT | Performed by: NURSE PRACTITIONER

## 2020-01-01 RX ORDER — HYDROXYZINE HYDROCHLORIDE 25 MG/1
25 TABLET, FILM COATED ORAL 3 TIMES DAILY PRN
Status: DISCONTINUED | OUTPATIENT
Start: 2020-01-01 | End: 2020-01-03 | Stop reason: HOSPADM

## 2020-01-01 RX ORDER — MAGNESIUM HYDROXIDE/ALUMINUM HYDROXICE/SIMETHICONE 120; 1200; 1200 MG/30ML; MG/30ML; MG/30ML
30 SUSPENSION ORAL EVERY 6 HOURS PRN
Status: DISCONTINUED | OUTPATIENT
Start: 2020-01-01 | End: 2020-01-03 | Stop reason: HOSPADM

## 2020-01-01 RX ORDER — PALIPERIDONE 9 MG/1
9 TABLET, EXTENDED RELEASE ORAL DAILY
Status: DISCONTINUED | OUTPATIENT
Start: 2020-01-01 | End: 2020-01-03 | Stop reason: HOSPADM

## 2020-01-01 RX ORDER — LITHIUM CARBONATE 450 MG
900 TABLET, EXTENDED RELEASE ORAL NIGHTLY
Status: DISCONTINUED | OUTPATIENT
Start: 2020-01-01 | End: 2020-01-03 | Stop reason: HOSPADM

## 2020-01-01 RX ORDER — HALOPERIDOL 5 MG/ML
10 INJECTION INTRAMUSCULAR EVERY 6 HOURS PRN
Status: DISCONTINUED | OUTPATIENT
Start: 2020-01-01 | End: 2020-01-03 | Stop reason: HOSPADM

## 2020-01-01 RX ORDER — LORAZEPAM 2 MG/ML
2 INJECTION INTRAMUSCULAR EVERY 6 HOURS PRN
Status: DISCONTINUED | OUTPATIENT
Start: 2020-01-01 | End: 2020-01-03 | Stop reason: HOSPADM

## 2020-01-01 RX ORDER — LITHIUM CARBONATE 300 MG
600 TABLET ORAL NIGHTLY
COMMUNITY
End: 2022-03-08

## 2020-01-01 RX ORDER — BENZTROPINE MESYLATE 1 MG/ML
2 INJECTION INTRAMUSCULAR; INTRAVENOUS 2 TIMES DAILY PRN
Status: DISCONTINUED | OUTPATIENT
Start: 2020-01-01 | End: 2020-01-03 | Stop reason: HOSPADM

## 2020-01-01 RX ORDER — PALIPERIDONE 6 MG/1
9 TABLET, EXTENDED RELEASE ORAL NIGHTLY
Status: ON HOLD | COMMUNITY
End: 2020-01-03 | Stop reason: HOSPADM

## 2020-01-01 RX ORDER — ACETAMINOPHEN 325 MG/1
650 TABLET ORAL EVERY 4 HOURS PRN
Status: DISCONTINUED | OUTPATIENT
Start: 2020-01-01 | End: 2020-01-03 | Stop reason: HOSPADM

## 2020-01-01 RX ADMIN — TRAZODONE HYDROCHLORIDE 50 MG: 50 TABLET ORAL at 21:34

## 2020-01-01 RX ADMIN — PALIPERIDONE 9 MG: 9 TABLET, EXTENDED RELEASE ORAL at 14:44

## 2020-01-01 RX ADMIN — NICOTINE POLACRILEX 2 MG: 2 GUM, CHEWING BUCCAL at 19:14

## 2020-01-01 RX ADMIN — NICOTINE POLACRILEX 2 MG: 2 GUM, CHEWING BUCCAL at 16:36

## 2020-01-01 RX ADMIN — LITHIUM CARBONATE 900 MG: 450 TABLET, EXTENDED RELEASE ORAL at 21:34

## 2020-01-01 ASSESSMENT — SLEEP AND FATIGUE QUESTIONNAIRES
DO YOU HAVE DIFFICULTY SLEEPING: NO
DO YOU USE A SLEEP AID: NO
AVERAGE NUMBER OF SLEEP HOURS: 6

## 2020-01-01 ASSESSMENT — ENCOUNTER SYMPTOMS
RHINORRHEA: 0
VOMITING: 0
DIARRHEA: 0
SINUS PAIN: 0
SHORTNESS OF BREATH: 0
NAUSEA: 0
BACK PAIN: 0
SINUS PRESSURE: 0
WHEEZING: 0
ABDOMINAL PAIN: 0
COUGH: 0
TROUBLE SWALLOWING: 0
CONSTIPATION: 0

## 2020-01-01 ASSESSMENT — LIFESTYLE VARIABLES
HISTORY_ALCOHOL_USE: YES
HISTORY_ALCOHOL_USE: NO

## 2020-01-01 ASSESSMENT — PAIN - FUNCTIONAL ASSESSMENT: PAIN_FUNCTIONAL_ASSESSMENT: 0-10

## 2020-01-01 NOTE — GROUP NOTE
Group Therapy Note    Date: 1/1/2020    Group Start Time: 1600  Group End Time: 5824  Group Topic: Healthy Living/Wellness    [facility]    Christiano Calixto RN        Group Therapy Note    Attendees: 9/16         Patient's Goal:  Relaxation via stretching     Notes:  Pt participated     Status After Intervention:  Improved    Participation Level:  Active Listener and Interactive    Participation Quality: Appropriate, Attentive and Sharing      Speech:  normal      Thought Process/Content: Logical  Linear      Affective Functioning: Congruent      Mood: stable      Level of consciousness:  Alert, Oriented x4 and Attentive      Response to Learning: Able to verbalize current knowledge/experience, Able to verbalize/acknowledge new learning and Able to retain information      Endings: None Reported    Modes of Intervention: Education, Support, Socialization and Movement      Discipline Responsible: Registered Nurse      Signature:  Christiano Calixto RN

## 2020-01-01 NOTE — GROUP NOTE
Group Therapy Note    Date: 1/1/2020    Group Start Time: 0900  Group End Time: 0920  Group Topic: Community Meeting    MANDI [place]    Group Therapy Note    Attendees: 6    Patient's Goal:  Pt will demonstrate improved interpersonal skills and identify one daily goal    Notes:  Pt attended group and participated    Status After Intervention:  Improved    Participation Level:  Active Listener and Interactive    Participation Quality: Appropriate but lethargic      Speech:  normal      Thought Process/Content: Logical  Linear      Affective Functioning: Constricted      Mood: euthymic      Level of consciousness:  Alert and Attentive      Response to Learning: Able to verbalize current knowledge/experience, Able to verbalize/acknowledge new learning, Able to retain information, Capable of insight, Able to change behavior and Progressing to goal      Endings: None Reported    Modes of Intervention: Education, Support, Socialization, Exploration, Activity and Limit-setting      Discipline Responsible: Psychoeducational Specialist      Signature:  Charlie Shay, [address]

## 2020-01-01 NOTE — BH NOTE
alternative other than inpatient care. Past Psychiatric History   Patient non-compliance with psychiatric care especially medication due to cost and coverage. Reported history of psychiatric inpatient hospitalizations. Denied history of suicide attempts. History of Substance Abuse     Patient denies cigarett, ETOH and street drug use endorses using marijuana 7 days per week due to having his legal marijuana card stating that he smokes [place] lot\". Family History of psychiatric disorders    Family history: positive for anxiety, bipolar disorder and cannabis use disorder - Dad and ETOH - Mom      Medical History   Allergies:  Bee venom   Past Medical History:   Diagnosis Date    Acne     Allergic rhinitis     Anterior cruciate ligament tear     Left knee.  Metacarpal bone fracture 2007    Right 3rd.  Traumatic injury 2009    Right arm laceration, median nerve laceration, brachial artery laceration with multiple surgeries. Past Surgical History:   Procedure Laterality Date    ANTERIOR CRUCIATE LIGAMENT REPAIR Right 01/2016    but retore it again in October 2016    ARM SURGERY Right 11/28/2009    Compartment syndrome right forearm, status post primary closure of fasciotomy right forearm.  ARTERY SURGERY Right 11/25/2009    Exploration right axillary brachial artery, right axillary to brachial artery bypass and right axillary to brachial artery venous bypass with greater saphenous vein.  FASCIOTOMY Right 11/25/2009    Fasciotomy right volar forearm.  NERVE SURGERY Right 11/25/2009    Repair of right median nerve, right ulnar nerve, right musculocutaneous nerve and repair of triceps muscle, Dr. Jenna Vang, Dr. Екатерина Bazan.            SOCIAL HISTORY  Social History     Socioeconomic History    Marital status: Single     Spouse name: Not on file    Number of children: Not on file    Years of education: Not on file    Highest education level: Not on file   Occupational History    Not on file Social Needs    Financial resource strain: Not on file    Food insecurity:     Worry: Not on file     Inability: Not on file    Transportation needs:     Medical: Not on file     Non-medical: Not on file   Tobacco Use    Smoking status: Never Smoker    Smokeless tobacco: Never Used   Substance and Sexual Activity    Alcohol use: No    Drug use: Never     Frequency: 2.0 times per week     Types: Marijuana     Comment: started 5/2018, states has medical card for Marijuana as of 11-9-18    Sexual activity: Not Currently     Partners: Female   Lifestyle    Physical activity:     Days per week: Not on file     Minutes per session: Not on file    Stress: Not on file   Relationships    Social connections:     Talks on phone: Not on file     Gets together: Not on file     Attends Latter day service: Not on file     Active member of club or organization: Not on file     Attends meetings of clubs or organizations: Not on file     Relationship status: Not on file    Intimate partner violence:     Fear of current or ex partner: Not on file     Emotionally abused: Not on file     Physically abused: Not on file     Forced sexual activity: Not on file   Other Topics Concern    Not on file   Social History Narrative    ** Merged History Encounter **              Mental Status  Pt. was alert, fully oriented, and cooperative. Appearance and hygiene wereappropriate, well-groomed . Mood was euthymic. Affect was euthymic and appropriately reactive Thought process was linear and well-organized. Patient endorsed auditory hallucinations of voices telling him \"lots of bible stories\" when off of medication. Patient denied suicidal ideations. Patient denied homicidal ideations . Patient's gross cognitive functions were intact. Insight and judgement were fair. Both recent and remote memory were intact. Psychomotor status was restless and fidgety.     Diagnostic Impression    Bipolar I Disorder with Isabelle      Medications    haloperidol lactate **AND** LORazepam, acetaminophen, aluminum & magnesium hydroxide-simethicone, benztropine mesylate, hydrOXYzine, magnesium hydroxide, traZODone, nicotine polacrilex    Treatment Plan:  · Continue inpatient psychiatric treatment. · Re-start Lithium 900mg @ HS beginning 1/1/20. · Lithium level to be drawn 1/2/20. · Re-start Invega 9mg daily for 4 days beginning 1/1/20. · New Order - Invega Sustenna 254 mg. 1/2/20 Once. [place] Stustenna 156mg 1/8/20 and every 28 days. · Supportive therapy with medication management. Reviewed risks and benefits as well as potential side effects with patient. · Review medications for efficacy and side effects. · Therapeutic support and empathetic care provided greater than 20 minutes. · Engage in therapeutic activities and groups. · Follow up at [facility] mental health [place] after symptoms stabilized.     Estimated length of stay: 5-7 days    Stefanie Mendez APRN - CNP  Psychiatric Advanced Practice Nurse

## 2020-01-01 NOTE — BH NOTE
Patient given tobacco quitline number 87946579614 at this time, refusing to call at this time, states \" I just dont want to quit now\"- patient given information as to the dangers of long term tobacco use. Continue to reinforce the importance of tobacco cessation.
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
## 2020-01-01 NOTE — H&P
HISTORY and [address]       NAME:  Arias Casanova  MRN: [id number]   YOB: 1995   Date: 1/1/2020   Age: 30 y.o. Gender: male     COMPLAINT AND PRESENT HISTORY:      Arias Casanova is 30 y.o.,  male, admitted because of Bipolar Disorder. Patient reports he was pink slipped from [place] due to an increase in yamila. Per chart notes from [facility], patient reportedly threw a chair against a wall at the house. Patient reports he was at the [facility] and recently discharged. Patient states he was off his medications for a few days due to [facility] one medication was going to cost me thousand dollars. \"  Patient reports when he becomes manic he has very poor sleep patterns, so he has not been sleeping or to admission. Reports appetite is poor. Reports fair memory and poor concentration. Poor insight. He currently denies SI/HI. States he \"just wanted to get back on my medication. \" No current auditory, visual or tactile hallucinations. Patient denies any current alcohol abuse. States he smokes marijuana, and has a medical marijuana card. Patient states he lives in [place], [place] and was down in [place] for [employer]. 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 See psych admission note for further psychiatric history for this patient. DIAGNOSTIC RESULTS     U/A:  Lab Results   Component Value Date    COLORU DARK YELLOW 12/03/2019    WBCUA 2 TO 5 12/03/2019    RBCUA None 12/03/2019    MUCUS 1+ 12/03/2019    BACTERIA NOT REPORTED 12/03/2019    SPECGRAV 1.039 12/03/2019    LEUKOCYTESUR NEGATIVE 12/03/2019    GLUCOSEU NEGATIVE 12/03/2019    AMORPHOUS 3+ 12/03/2019       PAST MEDICAL HISTORY     Past Medical History:   Diagnosis Date    Acne     Allergic rhinitis     Anterior cruciate ligament tear     Left knee.  Metacarpal bone fracture 2007    Right 3rd.     Traumatic injury 2009    Right arm laceration, median Inability: None    Transportation needs:     Medical: None     Non-medical: None   Tobacco Use    Smoking status: Never Smoker    Smokeless tobacco: Never Used   Substance and Sexual Activity    Alcohol use: No    Drug use: Never     Frequency: 2.0 times per week     Types: Marijuana     Comment: started 5/2018, states has medical card for Marijuana as of 11-9-18    Sexual activity: Not Currently     Partners: Female   Lifestyle    Physical activity:     Days per week: None     Minutes per session: None    Stress: None   Relationships    Social connections:     Talks on phone: None     Gets together: None     Attends Judaism service: None     Active member of club or organization: None     Attends meetings of clubs or organizations: None     Relationship status: None    Intimate partner violence:     Fear of current or ex partner: None     Emotionally abused: None     Physically abused: None     Forced sexual activity: None   Other Topics Concern    None   Social History Narrative    ** Merged History Encounter **                REVIEW OF SYSTEMS      Allergies   Allergen Reactions    Bee Venom      Stings. Uses EpiPen. No current facility-administered medications on file prior to encounter. Current Outpatient Medications on File Prior to Encounter   Medication Sig Dispense Refill    lithium 300 MG tablet Take 900 mg by mouth nightly      paliperidone (INVEGA) 6 MG extended release tablet Take 9 mg by mouth nightly      divalproex (DEPAKOTE ER) 500 MG extended release tablet Take 2 tablets by mouth daily 14 tablet 0    traZODone (DESYREL) 50 MG tablet Take 1 tablet by mouth nightly as needed for Sleep 14 tablet 0    lurasidone (LATUDA) 40 MG TABS tablet Take 1 tablet by mouth Daily with supper 14 tablet 0        Review of Systems   Constitutional: Positive for activity change. Negative for chills, fatigue and fever.    HENT: Negative for congestion, postnasal drip, rhinorrhea, sinus pressure, sinus pain and trouble swallowing. Respiratory: Negative for cough, shortness of breath and wheezing. Cardiovascular: Negative for chest pain and palpitations. Gastrointestinal: Negative for abdominal pain, constipation, diarrhea, nausea and vomiting. Genitourinary: Negative for difficulty urinating, dysuria and urgency. Musculoskeletal: Negative for arthralgias, back pain, myalgias and neck pain. Neurological: Negative for dizziness, seizures, weakness, numbness and headaches. Psychiatric/Behavioral: Positive for decreased concentration, dysphoric mood and sleep disturbance (relates to yamila). Negative for hallucinations and suicidal ideas. The patient is nervous/anxious and is hyperactive. GENERAL PHYSICAL EXAM:     Vitals: /73   Pulse 85   Temp 97.9 °F (36.6 °C) (Oral)   Resp 15   Ht 5' 6\" (1.676 m)   Wt 135 lb (61.2 kg)   BMI 21.79 kg/m²  Body mass index is 21.79 kg/m². Pt was examined with a nurse present in the room. GENERAL APPEARANCE:  To Saldaña is 30 y.o.,  male, not obese, nourished, conscious, alert. Does not appear to be distress or pain at this time. SKIN:  Warm, dry, no cyanosis or jaundice. Tattoos. HEAD:  Normocephalic, atraumatic, no swelling or tenderness. EYES:  Pupils equal, reactive to light, Conjunctiva is clear, EOMs intact amelie. eyelids WNL. EARS:  No discharge, no marked hearing loss. NOSE:  No rhinorrhea, epistaxis or septal deformity. THROAT:  Uvula midline. No ulceration bleeding or discharge. NECK:  No stiffness, trachea central.    CHEST:  Symmetrical and equal on expansion. HEART:  Regular rate and rhythm. S1 > S2, No audible murmurs or gallops. LUNGS:  Equal on expansion, normal breath sounds. ABDOMEN:  Soft on palpation. No localized tenderness. No guarding or rigidity. No palpable organomegaly. LYMPHATICS:  No palpable anterior cervical lymphadenopathy.      LOCOMOTOR, BACK AND SPINE:  No tenderness or deformities. EXTREMITIES:  Large vertical scar to the right arm s/p fasciotomy 2009. Symmetrical, no pedal edema. No calf tenderness. No discoloration or ulcerations. NEUROLOGIC:  The patient is conscious, alert, oriented, No apparent focal sensory deficits. No motor deficits, muscle strength equal Dariel. No facial droop, tongue protrudes centrally, no slurring of the speech. Cranial nerves 3-12 reveal no deficits, taste and smell not assessed. PROVISIONAL DIAGNOSES:      Active Problems:    Bipolar 1 disorder ([facility])  Resolved Problems:    * No resolved hospital problems.  *      LING Byers - CNP on 1/1/2020 at 11:55 AM

## 2020-01-01 NOTE — BH NOTE
Medications Collected?: Not Applicable  Other Valuables: Other (Comment)(lighter, metal badge)     Valuables placed in safe in security envelope, number: \[id number]. Patient oriented to surroundings and program expectations and copy of patient rights given. Received admission packet. Consents reviewed, signed. Patient verbalized understanding. Patient was taken to ED by police after being called by wife and mother due to patient's manic and destructive behavior. Pt reports his insurance stopped paying for his medications and he has been off them for several days causing his manic behavior. Pt was calm and cooperative upon arrival to unit.                  Zeke Narayan RN

## 2020-01-01 NOTE — GROUP NOTE
Group Therapy Note    Date: 1/1/2020    Group Start Time: 1330  Group End Time: 2041  Group Topic: Psychoeducation    VICKY GroveS    Group Therapy Note    Attendees: 7    Patient's Goal:  Pt will identify new leisure interests and coping strategies    Notes:  Pt attended group and participated    Status After Intervention:  Improved    Participation Level:  Active Listener    Participation Quality: Appropriate, Attentive      Speech:  normal      Thought Process/Content: Logical  Linear      Affective Functioning: Blunted      Mood: euthymic      Level of consciousness:  Alert, Oriented x4 and Attentive      Response to Learning: Able to verbalize current knowledge/experience, Able to verbalize/acknowledge new learning, Able to retain information, Capable of insight, Able to change behavior and Progressing to goal      Endings: None Reported    Modes of Intervention: Education, Support, Socialization, Exploration and Clarifying      Discipline Responsible: Psychoeducational Specialist      Signature:  David Morton [place]

## 2020-01-02 LAB
LITHIUM DATE LAST DOSE: ABNORMAL
LITHIUM DOSE AMOUNT: ABNORMAL
LITHIUM DOSE TIME: 2134
LITHIUM LEVEL: 0.4 MMOL/L (ref 0.6–1.2)

## 2020-01-02 PROCEDURE — 6370000000 HC RX 637 (ALT 250 FOR IP): Performed by: PSYCHIATRY & NEUROLOGY

## 2020-01-02 PROCEDURE — 1240000000 HC EMOTIONAL WELLNESS R&B

## 2020-01-02 PROCEDURE — 6370000000 HC RX 637 (ALT 250 FOR IP): Performed by: NURSE PRACTITIONER

## 2020-01-02 PROCEDURE — 80178 ASSAY OF LITHIUM: CPT

## 2020-01-02 PROCEDURE — 36415 COLL VENOUS BLD VENIPUNCTURE: CPT

## 2020-01-02 PROCEDURE — 99232 SBSQ HOSP IP/OBS MODERATE 35: CPT | Performed by: NURSE PRACTITIONER

## 2020-01-02 RX ADMIN — TRAZODONE HYDROCHLORIDE 50 MG: 50 TABLET ORAL at 20:55

## 2020-01-02 RX ADMIN — PALIPERIDONE 9 MG: 9 TABLET, EXTENDED RELEASE ORAL at 09:31

## 2020-01-02 RX ADMIN — NICOTINE POLACRILEX 2 MG: 2 GUM, CHEWING BUCCAL at 09:32

## 2020-01-02 RX ADMIN — NICOTINE POLACRILEX 2 MG: 2 GUM, CHEWING BUCCAL at 16:43

## 2020-01-02 RX ADMIN — LITHIUM CARBONATE 900 MG: 450 TABLET, EXTENDED RELEASE ORAL at 20:55

## 2020-01-02 ASSESSMENT — PAIN SCALES - GENERAL: PAINLEVEL_OUTOF10: 0

## 2020-01-02 NOTE — GROUP NOTE
Group Therapy Note    Date: 1/1/2020    Group Start Time: 2030  Group End Time: 2100  Group Topic: Wrap-Up    GABRIEL Olivares RN        Group Therapy Note    Attendees: 6         Group Therapy Note    Date: 1/1/2020    Group Start Time: 2030  Group End Time: 2100  Group Topic: Wrap-Up    GABRIEL Olivares RN        Group Therapy Note    Attendees: 6            Discipline Responsible:   OT  AT   x Nsg.  RT  Other       Participation Level:     None  Minimal   x Active Listener x Interactive    Monopolizing         Participation Quality:  x Appropriate  Inappropriate   x       Attentive        Intrusive   x       Sharing        Resistant   x       Supportive        Lethargic       Affective:   x Congruent  Incongruent  Blunted  Flat    Constricted  Anxious  Elated  Angry    Labile  Depressed  Other         Cognitive:  x Alert  Oriented PPTP     Concentration x G  F  P   Attention Span x G  F  P   Short-Term Memory x G  F  P   Long-Term Memory x G  F  P   ProblemSolving/  Decision Making x G  F  P   Ability to Process  Information x G  F  P      Contributing Factors             Delusional             Hallucinating             Flight of Ideas             Other:       Modes of Intervention:  x Education  Support  Exploration   x Clarifying  Problem Solving  Confrontation    Socialization  Limit Setting  Reality Testing    Activity  Movement  Media    Other:            Response to Learning:  x Able to verbalize current knowledge/experience   x Able to verbalize/acknowledge new learning    Able to retain information    Capable of insight    Able to change behavior    Progressing to goal    Other:        Comments:         Signature:  Charly Olivares RN

## 2020-01-02 NOTE — GROUP NOTE
Group Therapy Note    Date: 1/2/2020    Group Start Time: 1330  Group End Time: 0496  Group Topic: Psychoeducation    MATT PASTORA SRIKANTH Swan        Group Therapy Note    Attendees: 11/24         Patient's Goal:  To increase knowledge of leisure/recreation and demonstrate improved interpersonal skills    Notes:  Patient was pleasant and appropriate throughout the session     Status After Intervention:  Improved    Participation Level:  Active Listener and Interactive    Participation Quality: Appropriate, Attentive, Sharing and Supportive      Speech:  normal      Thought Process/Content: Logical      Affective Functioning: Congruent      Mood: euthymic      Level of consciousness:  Alert, Oriented x4 and Attentive      Response to Learning: Able to verbalize current knowledge/experience, Able to verbalize/acknowledge new learning, Capable of insight and Progressing to goal      Endings: None Reported    Modes of Intervention: Education, Support, Socialization, Exploration, Clarifying, Problem-solving and Activity      Discipline Responsible: Psychoeducational Specialist      Signature:  Gosia Woodruff

## 2020-01-02 NOTE — PROGRESS NOTES
Department of Psychiatry  Attending Progress Note    Chief Complaint: Bipolar 1 disorder ([facility] [address]     SUBJECTIVE:  Patient is seen today in the day area. He is active and is social with peers. He is restless at times and is observed pacing on the unit. He is still religiously focused at times. He is excited about the prospect of being on an injectable medication. States he already is feeling better being back on medication and feeling more in control. He denies any suicidal or homicidal thoughts. He denies any hallucinations today. He will receive the first of his invega injections today. He states he feels like his mind is slowing down. Explored his  feelings and concerns. Reassurance and support provided. Charting and medications reviewed. Denies any side effects from medications. There is no identifiable safe alternative other than continued hospitalization. ROS:  Review of Systems completed and no physical complaints. Pertinent psychiatric information as noted in the HPI.     OBJECTIVE:     Physical  /70   Pulse 107   Temp 97.3 °F (36.3 °C) (Oral)   Resp 14   Ht 5' 6\" (1.676 m)   Wt 135 lb (61.2 kg)   BMI 21.79 kg/m²      Mental Status Evaluation:  Orientation: alertness: alert   Mood:. euthymic      Affect:  increased in intensity      Appearance:  bearded and casually dressed   Activity:  Psychomotor Agitation and Restless & fidgety   Gait/Posture: Normal   Speech:  normal pitch, normal volume and pressured   Thought Process:  circumstantial   Thought Content:  Restorationism at times   Sensorium:  person, place and time/date   Cognition:  grossly intact   Memory: intact   Insight:  limited   Judgment: limited   Suicidal Ideations: denies suicidal ideation   Homicidal Ideations: Negative for homicidal ideation      Medication Side Effects: absent,       Attention Span attention span appeared shorter than expected for age     Labs  Recent Results (from the past 67 hour(s))   LITHIUM LEVEL Collection Time: 01/02/20  7:09 AM   Result Value Ref Range    Lithium Lvl 0.4 (L) 0.6 - 1.2 mmol/L    Lithium Dose Amount 900MG     Lithium Date Last Dose 1,012,020     Lithium Dose Time 2,134        Medications  Current Facility-Administered Medications   Medication Dose Route Frequency Provider Last Rate Last Dose    haloperidol lactate (HALDOL) injection 10 mg  10 mg Intramuscular Q6H PRN LING Andrade CNP        And    LORazepam (ATIVAN) injection 2 mg  2 mg Intramuscular Q6H PRN LING Andrade CNP        acetaminophen (TYLENOL) tablet 650 mg  650 mg Oral Q4H PRN LING Andrade CNP        aluminum & magnesium hydroxide-simethicone (MAALOX) 200-200-20 MG/5ML suspension 30 mL  30 mL Oral Q6H PRN LING Andrade CNP        benztropine mesylate (COGENTIN) injection 2 mg  2 mg Intramuscular BID PRN LING Andrade CNP        hydrOXYzine (ATARAX) tablet 25 mg  25 mg Oral TID PRN LING Andrade CNP        magnesium hydroxide (MILK OF MAGNESIA) 400 MG/5ML suspension 30 mL  30 mL Oral Daily PRN LING Andrade CNP        lithium [facility]) extended release tablet 900 mg  900 mg Oral Nightly LING Diaz CNP   900 mg at 01/01/20 2134    paliperidone (INVEGA) extended release tablet 9 mg  9 mg Oral Daily LING Diaz CNP   9 mg at 01/02/20 0931    paliperidone palmitate ER (INVEGA SUSTENNA) IM injection 234 mg  234 mg Intramuscular Once LING Diaz CNP        [START ON 1/8/2020] paliperidone palmitate ER (INVEGA SUSTENNA) IM injection 156 mg  156 mg Intramuscular Q28 Days LING Diaz CNP        traZODone (DESYREL) tablet 50 mg  50 mg Oral Nightly PRN Deon Taylor MD   50 mg at 01/01/20 2134    nicotine polacrilex (NICORETTE) gum 2 mg  2 mg Oral PRN Deon Taylor MD   2 mg at 01/02/20 0932         lithium  900 mg Oral Nightly    paliperidone  9 mg Oral Daily   

## 2020-01-02 NOTE — GROUP NOTE
Group Therapy Note    Date: 1/2/2020    Group Start Time: 1100  Group End Time: 0778  Group Topic: Cognitive Skills    GABRIEL Owenruth Barrettkaryna        Group Therapy Note    Attendees: 3/12         Patient's Goal:  To demonstrate improved interpersonal skills and increase cognition      Notes:  Patient was pleasant and appropriate throughout the session. Patient was supportive and sharing towards peers, sharing that he once punched a window out of anger and since has learned how to control his anger. Status After Intervention:  Improved    Participation Level:  Active Listener and Interactive    Participation Quality: Appropriate, Attentive, Sharing and Supportive      Speech:  normal      Thought Process/Content: Logical      Affective Functioning: Congruent      Mood: euthymic      Level of consciousness:  Alert, Oriented x4 and Attentive      Response to Learning: Able to verbalize current knowledge/experience, Able to verbalize/acknowledge new learning, Capable of insight and Progressing to goal      Endings: None Reported    Modes of Intervention: Education, Support, Socialization, Exploration and Clarifying      Discipline Responsible: Psychoeducational Specialist      Signature:  Remedios Ryan

## 2020-01-02 NOTE — GROUP NOTE
Group Therapy Note    Date: 1/2/2020    Group Start Time: 1000  Group End Time: 7018  Group Topic: Psychotherapy    [facility] [address] ARAMIS Harris        Group Therapy Note    Attendees: 6         Patient's Goal:  Expression of feeling     Notes:      Status After Intervention:  Unchanged    Participation Level:  Active Listener    Participation Quality: Appropriate and Attentive      Speech:  normal      Thought Process/Content: Logical      Affective Functioning: Congruent      Mood: euthymic      Level of consciousness:  Alert and Oriented x4      Response to Learning: Able to verbalize current knowledge/experience and Able to verbalize/acknowledge new learning      Endings: None Reported    Modes of Intervention: Education and Support      Discipline Responsible: /Counselor      Signature:  ARAMIS Novoa

## 2020-01-03 VITALS
HEART RATE: 105 BPM | BODY MASS INDEX: 21.69 KG/M2 | SYSTOLIC BLOOD PRESSURE: 131 MMHG | WEIGHT: 135 LBS | TEMPERATURE: 98.1 F | RESPIRATION RATE: 14 BRPM | DIASTOLIC BLOOD PRESSURE: 85 MMHG | HEIGHT: 66 IN

## 2020-01-03 PROCEDURE — 6370000000 HC RX 637 (ALT 250 FOR IP): Performed by: NURSE PRACTITIONER

## 2020-01-03 PROCEDURE — 99238 HOSP IP/OBS DSCHRG MGMT 30/<: CPT | Performed by: NURSE PRACTITIONER

## 2020-01-03 RX ADMIN — PALIPERIDONE 9 MG: 9 TABLET, EXTENDED RELEASE ORAL at 07:58

## 2020-01-03 ASSESSMENT — PAIN SCALES - GENERAL: PAINLEVEL_OUTOF10: 0

## 2020-01-03 NOTE — PLAN OF CARE
[facility]  Day 3 Interdisciplinary Treatment Plan NOTE    Review Date & Time: 1/2/20 2658    Admission Type:   Admission Type: Involuntary(signed in)    Reason for admission:  Reason for Admission: Patient has been off medications for a few days, manic, agitated, delusional at home. He reportedly threw a chair at home and the police were called to his home several times. Estimated Length of Stay:  5-7 days  Estimated Discharge Date Update: to be determined by physician    PATIENT STRENGTHS:  Patient Strengths:Strengths: Communication, No significant Physical Illness, Employment, Social Skills, Positive Support  Patient Strengths and Limitations:Limitations: Inappropriate/potentially harmful leisure interests, Multiple barriers to leisure interests  Addictive Behavior:Addictive Behavior  In the past 3 months, have you felt or has someone told you that you have a problem with:  : None  Do you have a history of Chemical Use?: No  Do you have a history of Alcohol Use?: Yes  Do you have a history of Street Drug Abuse?: Yes  Histroy of Prescripton Drug Abuse?: Yes  Medical Problems:  Past Medical History:   Diagnosis Date    Acne     Allergic rhinitis     Anterior cruciate ligament tear     Left knee. Metacarpal bone fracture 2007    Right 3rd. Traumatic injury 2009    Right arm laceration, median nerve laceration, brachial artery laceration with multiple surgeries.        Risk:  Fall RiskTotal: 53  Jai Scale Jai Scale Score: 22  BVC Total: 0  Change in scores:  No Changes to plan of Care:  No    Status EXAM:   Status and Exam  Normal: No  Facial Expression: Flat  Affect: Appropriate  Level of Consciousness: Alert  Mood:Normal: No  Mood: Depressed, Anxious, Helpless  Motor Activity:Normal: Yes  Interview Behavior: Aggressive  Preception: [place] to Person, [place] to Situation, [place] to Place, [place] to Time  Attention:Normal: No  Attention: Distractible  Thought Processes:
Patient is alert and oriented, able to make needs known. Patient denies suicidal thoughts, denies thoughts of self harm or harm to others. Patient denies depression and anxiety. Patient denies hallucinations, no angry outburst. States his medications are working. Patient is medication complaint. Patient has insight, states he feels ready for discharge.
Problem: Altered Mood, Psychotic Behavior:  Goal: Able to verbalize reality based thinking  Description  Able to verbalize reality based thinking       Problem: Altered Mood, Psychotic Behavior:  Goal: Absence of self-harm  Description     Pt denies suicidal ideations at this time. Pt agreed to seek staff at anytime he felt like any urges to harm self would arise. Safety checks maintained [id number]. Patient is complaining of anxiety at this time. Stating that they feel restless and are having trouble sleeping and claming down in order to rest this evening. Medication was given as prescribed for increased anxiety. Pt remains free from self harm this shift. Pt denies wanting to cause harm to self or others at this time. Pt encouraged to seek nursing staff at anytime if he felt at danger to themselves or others. Pt states understanding.  Safety checks maintained [id number]
Problem: Anger Management/Homicidal Ideation:  Goal: Ability to verbalize frustrations and anger appropriately will improve  Description  Ability to verbalize frustrations and anger appropriately will improve  1/2/2020 0146 by Supriya Connelly RN  Outcome: Ongoing     Problem: Anger Management/Homicidal Ideation:  Goal: Absence of angry outbursts  Description  Absence of angry outbursts  1/2/2020 0146 by Supriya Connelly RN  Outcome: Ongoing     Problem: Altered Mood, Psychotic Behavior:  Goal: Absence of self-harm  Description  Absence of self-harm  1/2/2020 0146 by Supriya Connelly RN  Outcome: Ongoing     Problem: Suicide risk  Goal: Provide patient with safe environment  Description  Provide patient with safe environment  Outcome: Ongoing     Patient denies suicidal and homicidal ideation at this time. Patient out in the halls and pacing with another patient. Patient had no angry outburst at this time. Patient is free of self harm at this time. Patient agrees to seek out staff if thoughts to harm self arise. Staff will provide support and reassurance as needed. Safety checks maintained every 15 minutes.
Ideation: No    EDUCATION:   Learner Progress Toward Treatment Goals: reviewed group plans and strategies for care    Method:group therapy, medication compliance, individualized assessments and care planning    Outcome: needs reinforcement    PATIENT GOALS: to be discussed with patient within 72 hours    PLAN/TREATMENT RECOMMENDATIONS:     continue group therapy , medications compliance, goal setting, individualized assessments and care, continue to monitor pt on unit      SHORT-TERM GOALS:   Time frame for Short-Term Goals: 5-7 days    LONG-TERM GOALS:  Time frame for Long-Term Goals: 6 months  Members Present in Team Meeting: See Signature Sheet    Charlene Nicolas

## 2020-01-03 NOTE — GROUP NOTE
Group Therapy Note    Date: 1/3/2020    Group Start Time: 2045  Group End Time: 2110  Group Topic: Wrap-Up    [facility]    Nadine Jacob RN        Group Therapy Note    Attendees: 7/23         Patient attended pm wrap up group. Signature:   Nadine Jacob RN

## 2020-01-03 NOTE — GROUP NOTE
Group Therapy Note    Date: 1/3/2020    Group Start Time: 1000  Group End Time: 7626  Group Topic: Psychotherapy    GABRIEL [facility] ARAMIS Dyer        Group Therapy Note    Attendees: 6/13         Patient's Goal:  Expression of feeling     Notes:      Status After Intervention:  Unchanged    Participation Level:  Active Listener    Participation Quality: Appropriate and Attentive      Speech:  normal      Thought Process/Content: Logical      Affective Functioning: Congruent      Mood: euthymic      Level of consciousness:  Alert and Oriented x4      Response to Learning: Able to verbalize current knowledge/experience and Able to verbalize/acknowledge new learning      Endings: None Reported    Modes of Intervention: Education and Support      Discipline Responsible: /Counselor      Signature:  ARAMIS Morgan

## 2020-01-03 NOTE — BH NOTE
Patient given tobacco quitline number 27582885842 at this time, refusing to call at this time, states \" I just dont want to quit now\"- patient given information as to the dangers of long term tobacco use. Continue to reinforce the importance of tobacco cessation.

## 2020-01-03 NOTE — DISCHARGE SUMMARY
DISCHARGE SUMMARY  Patient ID:  Ramona Fernández  [id number]  17 y.o.  1995    Admit date: 12/31/2019    Discharge date and time: 1/3/2020  12:06 PM     Admitting Physician: Kenton Melendrez MD     Discharge Physician:  LING Argueta - CNP    Admission Diagnoses: Bipolar 1 disorder [facility]) [F31.9]    Discharge Diagnoses:   Bipolar 1 disorder [facility])     Patient Active Problem List   Diagnosis Code    Severe manic bipolar 1 disorder with psychotic behavior ([facility] [address] F31.2    Cannabis dependence ([facility] [address] F12.20    Bipolar 1 disorder ([facility] [address] F31.9        Admission Condition: poor    Discharged Condition: stable    Indication for Admission: threat to self    History of Present Illnes (present tense wording indicates findings from admission exam on 12/31/2019 and are not necessarily indicative of current findings): Hospital Course:   Kaci Byrd was admitted on a pink slip from [facility]. He had been off medications, manic, agitated and the police had been called to their home several times. Upon admission, Ramona Fernández was provided a safe secure environment, introduced to unit milieu. Patient participated in groups and individual therapies. He was restarted on Lithium  mg po qhs and started on Invega then transitioned to [place] 234 mg - given 1/2/2020. He is due for Invega Sustenna 156 mg IM on 1/6/2020. After few days of hospital care, patient began to feel improvement. Depression lifted and yamila improved. Sleep improved, appetite was good. On morning rounds 1/3/2020, patient endorsed feeling ready for discharge. Patient denies suicidal or homicidal ideations, denies hallucinations or delusions. Denies SE's from meds. It was decided that pt had achieved maximum benefit from hospital care and can be discharged to home.       Consults:   None    Significant Diagnostic Studies: Routine labs and diagnostics    Treatments: Psychotropic medications, therapy with group, milieu, and 1:1 with nurses, social workers, DECLAN/CNP, and Attending physician. Discharge Medications:  Current Discharge Medication List      START taking these medications    Details   paliperidone palmitate ER (INVEGA SUSTENNA) 156 MG/ML VIVI IM injection Inject 156 mg into the muscle every 28 days  Qty: 1.2 mg, Refills: 0         CONTINUE these medications which have NOT CHANGED    Details   lithium 300 MG tablet Take 900 mg by mouth nightly         STOP taking these medications       paliperidone (INVEGA) 6 MG extended release tablet Comments:   Reason for Stopping:         divalproex (DEPAKOTE ER) 500 MG extended release tablet Comments:   Reason for Stopping:         traZODone (DESYREL) 50 MG tablet Comments:   Reason for Stopping:         lurasidone (LATUDA) 40 MG TABS tablet Comments:   Reason for Stopping:                Core Measures statement:   Not applicable    Discharge Exam:  Level of consciousness:  Within normal limits  Appearance: Street clothes, seated, with good grooming  Behavior/Motor: No abnormalities noted  Attitude toward examiner:  Cooperative, attentive, good eye contact  Speech:  spontaneous, normal rate, normal volume and well articulated  Mood:  euthymic  Affect:  mood congruent  Thought processes:  linear, goal directed and coherent  Thought content:  Homocidal ideation denies  Suicidal Ideation:  denies suicidal ideation  Delusions:  no evidence of delusions  Perceptual Disturbance:  denies any perceptual disturbance  Cognition:  In tact  Memory: age appropriate  Insight & Judgement: fair  Medication side effects:  denies     Disposition: home    Patient Instructions: Activity: activity as tolerated    Follow-up as scheduled with [facility] on 1/6/2020 and 1/8/2020. Time Spent: 15 minutes    Engagement: Patient displayed a good level of engagement with the treatments offered during this admission.        Discharge planning, findings, and recommendations were discussed with patient and treatment team.    Signed:  Louanne Olszewski   1/3/2020  12:06 PM

## 2020-01-03 NOTE — PROGRESS NOTES
[facility]  Discharge Note    Pt discharged with followings belongings:   Dentures: None  Vision - Corrective Lenses: None  Hearing Aid: None  Jewelry: None  Body Piercings Removed: N/A  Clothing: Footwear, Pants, Shirt, Socks  Were All Patient Medications Collected?: Not Applicable  Other Valuables: Other (Comment)(lighter, metal badge)   Valuables sent home with patient Valuables retrieved from safe, Security envelope number:  [id number] and returned to patient. Patient education on aftercare instructions: Yes. Information faxed to [facility] by staff. Patient verbalize understanding of AVS:   Patient denied denied suicidal and homicidal ideations. .    Patient discharge to home with girl friend VIA car. Patient given all pesronal belonging.   Status EXAM upon discharge:  Status and Exam  Normal: Yes  Facial Expression: Brightened  Affect: Appropriate  Level of Consciousness: Alert  Mood:Normal: Yes  Mood: Depressed, Anxious  Motor Activity:Normal: Yes  Interview Behavior: Cooperative  Preception: Goff to Time, Goff to Place, Goff to Person, [place] to Situation  Attention:Normal: Yes  Attention: Distractible  Thought Processes: Circumstantial  Thought Content:Normal: Yes  Thought Content: Preoccupations  Hallucinations: None  Delusions: No  Memory:Normal: Yes  Insight and Judgment: No  Insight and Judgment: Poor Judgment  Present Suicidal Ideation: No  Present Homicidal Ideation: No      Metabolic Screening:    Lab Results   Component Value Date    LABA1C 4.7 11/11/2018       Lab Results   Component Value Date    CHOL 241 (H) 11/11/2018     Lab Results   Component Value Date    TRIG 125 11/11/2018     Lab Results   Component Value Date    HDL 73 11/11/2018     No components found for: LDLCAL  No results found for: Benjamin Spencer LPN

## 2020-01-06 ENCOUNTER — TELEPHONE (OUTPATIENT)
Dept: FAMILY MEDICINE CLINIC | Age: 25
End: 2020-01-06

## 2020-01-08 LAB
EKG ATRIAL RATE: 83 BPM
EKG P AXIS: 61 DEGREES
EKG P-R INTERVAL: 112 MS
EKG Q-T INTERVAL: 336 MS
EKG QRS DURATION: 88 MS
EKG QTC CALCULATION (BAZETT): 394 MS
EKG R AXIS: 93 DEGREES
EKG T AXIS: 43 DEGREES
EKG VENTRICULAR RATE: 83 BPM

## 2020-03-03 ENCOUNTER — HOSPITAL ENCOUNTER (OUTPATIENT)
Dept: LAB | Age: 25
Discharge: HOME | End: 2020-03-03
Payer: MEDICAID

## 2020-03-03 LAB
LITHIUM DATE LAST DOSE: ABNORMAL
LITHIUM DOSE AMOUNT: ABNORMAL
LITHIUM DOSE TIME: 2100
LITHIUM LEVEL: 0.5 MMOL/L (ref 0.6–1.2)
TSH SERPL DL<=0.05 MIU/L-ACNC: 1.69 MIU/L (ref 0.3–5)

## 2020-03-03 PROCEDURE — 84443 ASSAY THYROID STIM HORMONE: CPT

## 2020-03-03 PROCEDURE — 36415 COLL VENOUS BLD VENIPUNCTURE: CPT

## 2020-03-03 PROCEDURE — 80178 ASSAY OF LITHIUM: CPT

## 2021-05-31 ENCOUNTER — HOSPITAL ENCOUNTER (INPATIENT)
Age: 26
LOS: 3 days | Discharge: HOME | DRG: 751 | End: 2021-06-03
Attending: PSYCHIATRY & NEUROLOGY | Admitting: PSYCHIATRY & NEUROLOGY
Payer: MEDICARE

## 2021-05-31 PROBLEM — F23 ACUTE PSYCHOSIS (HCC): Status: ACTIVE | Noted: 2021-05-31

## 2021-05-31 PROCEDURE — 1240000000 HC EMOTIONAL WELLNESS R&B

## 2021-05-31 RX ORDER — IBUPROFEN 400 MG/1
400 TABLET ORAL EVERY 6 HOURS PRN
Status: DISCONTINUED | OUTPATIENT
Start: 2021-05-31 | End: 2021-06-03 | Stop reason: HOSPADM

## 2021-05-31 RX ORDER — LORAZEPAM 2 MG/ML
2 INJECTION INTRAMUSCULAR EVERY 4 HOURS PRN
Status: DISCONTINUED | OUTPATIENT
Start: 2021-05-31 | End: 2021-06-03 | Stop reason: HOSPADM

## 2021-05-31 RX ORDER — HALOPERIDOL 5 MG/ML
5 INJECTION INTRAMUSCULAR EVERY 4 HOURS PRN
Status: DISCONTINUED | OUTPATIENT
Start: 2021-05-31 | End: 2021-05-31

## 2021-05-31 RX ORDER — MAGNESIUM HYDROXIDE/ALUMINUM HYDROXICE/SIMETHICONE 120; 1200; 1200 MG/30ML; MG/30ML; MG/30ML
30 SUSPENSION ORAL EVERY 6 HOURS PRN
Status: DISCONTINUED | OUTPATIENT
Start: 2021-05-31 | End: 2021-06-03 | Stop reason: HOSPADM

## 2021-05-31 RX ORDER — HALOPERIDOL 5 MG/ML
5 INJECTION INTRAMUSCULAR EVERY 4 HOURS PRN
Status: DISCONTINUED | OUTPATIENT
Start: 2021-05-31 | End: 2021-06-03 | Stop reason: HOSPADM

## 2021-05-31 RX ORDER — DIPHENHYDRAMINE HYDROCHLORIDE 50 MG/ML
50 INJECTION INTRAMUSCULAR; INTRAVENOUS EVERY 4 HOURS PRN
Status: DISCONTINUED | OUTPATIENT
Start: 2021-05-31 | End: 2021-05-31

## 2021-05-31 RX ORDER — LORAZEPAM 1 MG/1
2 TABLET ORAL EVERY 4 HOURS PRN
Status: DISCONTINUED | OUTPATIENT
Start: 2021-05-31 | End: 2021-06-03 | Stop reason: HOSPADM

## 2021-05-31 RX ORDER — HALOPERIDOL 10 MG/1
5 TABLET ORAL EVERY 4 HOURS PRN
Status: DISCONTINUED | OUTPATIENT
Start: 2021-05-31 | End: 2021-06-03 | Stop reason: HOSPADM

## 2021-05-31 RX ORDER — TRAZODONE HYDROCHLORIDE 50 MG/1
50 TABLET ORAL NIGHTLY PRN
Status: DISCONTINUED | OUTPATIENT
Start: 2021-05-31 | End: 2021-06-02

## 2021-05-31 RX ORDER — HYDROXYZINE 50 MG/1
50 TABLET, FILM COATED ORAL 3 TIMES DAILY PRN
Status: DISCONTINUED | OUTPATIENT
Start: 2021-05-31 | End: 2021-06-03 | Stop reason: HOSPADM

## 2021-05-31 RX ORDER — HALOPERIDOL 10 MG/1
5 TABLET ORAL EVERY 4 HOURS PRN
Status: DISCONTINUED | OUTPATIENT
Start: 2021-05-31 | End: 2021-05-31

## 2021-05-31 RX ORDER — ACETAMINOPHEN 325 MG/1
650 TABLET ORAL EVERY 4 HOURS PRN
Status: DISCONTINUED | OUTPATIENT
Start: 2021-05-31 | End: 2021-06-03 | Stop reason: HOSPADM

## 2021-05-31 RX ORDER — POLYETHYLENE GLYCOL 3350 17 G/17G
17 POWDER, FOR SOLUTION ORAL DAILY PRN
Status: DISCONTINUED | OUTPATIENT
Start: 2021-05-31 | End: 2021-06-03 | Stop reason: HOSPADM

## 2021-05-31 RX ORDER — DIPHENHYDRAMINE HYDROCHLORIDE 50 MG/ML
50 INJECTION INTRAMUSCULAR; INTRAVENOUS EVERY 4 HOURS PRN
Status: DISCONTINUED | OUTPATIENT
Start: 2021-05-31 | End: 2021-06-03 | Stop reason: HOSPADM

## 2021-05-31 ASSESSMENT — SLEEP AND FATIGUE QUESTIONNAIRES
RESTFUL SLEEP: NO
AVERAGE NUMBER OF SLEEP HOURS: 6
DO YOU HAVE DIFFICULTY SLEEPING: YES
DIFFICULTY FALLING ASLEEP: YES
DIFFICULTY ARISING: NO
DIFFICULTY STAYING ASLEEP: YES
SLEEP PATTERN: INSOMNIA
DO YOU USE A SLEEP AID: NO

## 2021-05-31 ASSESSMENT — LIFESTYLE VARIABLES: HISTORY_ALCOHOL_USE: NO

## 2021-05-31 ASSESSMENT — PAIN SCALES - GENERAL: PAINLEVEL_OUTOF10: 0

## 2021-06-01 PROCEDURE — APPSS60 APP SPLIT SHARED TIME 46-60 MINUTES: Performed by: NURSE PRACTITIONER

## 2021-06-01 PROCEDURE — 99223 1ST HOSP IP/OBS HIGH 75: CPT | Performed by: PSYCHIATRY & NEUROLOGY

## 2021-06-01 PROCEDURE — 6370000000 HC RX 637 (ALT 250 FOR IP): Performed by: PSYCHIATRY & NEUROLOGY

## 2021-06-01 PROCEDURE — 1240000000 HC EMOTIONAL WELLNESS R&B

## 2021-06-01 RX ADMIN — NICOTINE POLACRILEX 2 MG: 2 GUM, CHEWING BUCCAL at 12:05

## 2021-06-01 RX ADMIN — NICOTINE POLACRILEX 2 MG: 2 GUM, CHEWING BUCCAL at 19:11

## 2021-06-01 RX ADMIN — HYDROXYZINE HYDROCHLORIDE 50 MG: 50 TABLET, FILM COATED ORAL at 22:13

## 2021-06-01 RX ADMIN — TRAZODONE HYDROCHLORIDE 50 MG: 50 TABLET ORAL at 22:13

## 2021-06-01 RX ADMIN — NICOTINE POLACRILEX 2 MG: 2 GUM, CHEWING BUCCAL at 21:13

## 2021-06-01 ASSESSMENT — LIFESTYLE VARIABLES: HISTORY_ALCOHOL_USE: NO

## 2021-06-01 NOTE — GROUP NOTE
Group Therapy Note    Date: 6/1/2021    Group Start Time: 0900  Group End Time: 0930  Group Topic: Community Meeting    [place]        Group Therapy Note    Attendees: 11/17         Patient's Goal:  To increase social interaction and to explore and identify short term goals r/t wellness and recovery. RT discussed group schedule and unit structure/resources and encouraged pts to be engaged in their treatment. Pts were given opportunities to ask questions. Notes: Pt came to group late but participated in group task and was able to identify short term goal \"to relax, have a chill day\". Pt is pleasant on approach. Status After Intervention:  Improved     Participation Level: Active Listener and Interactive     Participation Quality: Appropriate, Attentive, Sharing         Speech:   Normal     Thought Process/Content: Logical r/t task but brief guarded answers     Affective Functioning: Blunted, brightens     Mood: Euthymic.          Level of consciousness:  Alert, and Attentive        Response to Learning: Able to verbalize current knowledge/experience, Able to verbalize/acknowledge new learning, and Progressing to goal        Endings: None Reported     Modes of Intervention: Education, Support, Socialization, Exploration, Clarifying and Problem-solving        Discipline Responsible: Psychoeducational Specialist        Signature:  Eliseo Norman

## 2021-06-01 NOTE — PROGRESS NOTES
Behavioral Services  Medicare Certification Upon Admission    I certify that this patient's inpatient psychiatric hospital admission is medically necessary for:    [x] (1) Treatment which could reasonably be expected to improve this patient's condition,       [x] (2) Or for diagnostic study;     AND     [x](2) The inpatient psychiatric services are provided while the individual is under the care of a physician and are included in the individualized plan of care.     Estimated length of stay/service 5-9 days    Plan for post-hospital care -outpatient care    Electronically signed by Estela Heller MD on 6/1/2021 at 8:55 AM

## 2021-06-01 NOTE — GROUP NOTE
Group Therapy Note    Date: 6/1/2021    Group Start Time: 1430  Group End Time: 1520  Group Topic: Cognitive Skills    [facility]    MANDI Mercer        Group Therapy Note    Attendees: 8/11         Patient's Goal:  To increase social interaction and to practice problem solving,and communication skills        Notes: Pt participated fully in group . Pt was able to practice problem solving, communication skills . Pt is pleasant and supportive of peers. Status After Intervention:  Improved         Participation Level: Active Listener and Interactive     Participation Quality: Appropriate, Attentive, Sharing and Supportive        Speech:  Normal        Thought Process/Content: Logical , linear r/t task.      Affective Functioning: Congruent, brightens     Mood: euthymic        Level of consciousness:  Alert, Oriented x4 and Attentive        Response to Learning: Able to verbalize current knowledge/experience, Able to verbalize/acknowledge new learning ,  and Progressing to goal        Endings: None Reported     Modes of Intervention: Education, Support, Socialization, Exploration, Clarifying and Problem-solving        Discipline Responsible: Psychoeducational Specialist      Signature:  MANDI Green
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
## 2021-06-01 NOTE — PROGRESS NOTES
Patient given tobacco quitline number 67325965458 at this time, refusing to call at this time, states \" I just dont want to quit now\"- patient given information as to the dangers of long term tobacco use. Continue to reinforce the importance of tobacco cessation.

## 2021-06-01 NOTE — PROGRESS NOTES
Pharmacy Medication History Note      List of current medications patient is taking is complete. Source of information: [facility], [facility]ermain Cadet    Changes made to medication list:  Medications removed (include reason, ex. therapy complete or physician discontinued, noncompliance):  none    Medications added/doses adjusted:  none    Other notes (ex. Recent course of antibiotics, Coumadin dosing): The patient has not been seen by the [facility] since 6/3/2020. Please let me know if you have any questions about this encounter. Thank you!     Electronically signed by Oscar Mansfield [address] [place] [address] on 6/1/2021 at 10:21 AM
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
## 2021-06-01 NOTE — GROUP NOTE
Group Therapy Note    Date: 6/1/2021    Group Start Time: 1000  Group End Time: 1050  Group Topic: Cognitive Skills    MANDI Nunez        Group Therapy Note    Attendees: 3/5         Patient's Goal:  To increase social interaction and to practice problem solving,concentration, and communication skills        Notes: Pt participated fully in group . Pt was able to practice problem solving,concentration, communication skills . Pt is pleasant and supportive of peers. Pt became slightly restless after 30 mins of group and stood up but continued with task. After 5 mins of doing this pt left group due to unable to remain still/focused. Pt performed task well when in group. Status After Intervention:  Improved         Participation Level: Active Listener and Interactive     Participation Quality: Appropriate, Attentive, Sharing and Supportive        Speech:  Normal        Thought Process/Content: Logical , linear r/t task.      Affective Functioning: Blunted, brightens     Mood: euthymic        Level of consciousness:  Alert, Oriented x4 and Attentive        Response to Learning: Able to verbalize current knowledge/experience, Able to verbalize/acknowledge new learning ,  and Progressing to goal        Endings: None Reported     Modes of Intervention: Education, Support, Socialization, Exploration, Clarifying and Problem-solving        Discipline Responsible: Psychoeducational Specialist      Signature:  MANDI Kerr

## 2021-06-01 NOTE — GROUP NOTE
Group Therapy Note    Date: 6/1/2021    Group Start Time: 0000  Group End Time: 36  Group Topic: Psychotherapy    [facility]    Graciela Carpio        Group Therapy Note       Patient refused to attend psychotherapy group after encouragement from staff. 1:1 talk time offered but refused. Signature:   Graciela Carpio

## 2021-06-01 NOTE — PROGRESS NOTES
`[facility]  Admission Note     Admission Type:   Admission Type: Involuntary    Reason for admission:  Reason for Admission: Hallucinations,agitation,confused,manic off medications    PATIENT STRENGTHS:  Strengths: Positive Support    Patient Strengths and Limitations:  Limitations: Tendency to isolate self, Difficulty problem solving/relies on others to help solve problems, Inappropriate/potentially harmful leisure interests    Addictive Behavior:   Addictive Behavior  In the past 3 months, have you felt or has someone told you that you have a problem with:  : None  Do you have a history of Chemical Use?: No  Do you have a history of Alcohol Use?: No  Do you have a history of Street Drug Abuse?: No  Histroy of Prescripton Drug Abuse?: No    Medical Problems:   Past Medical History:   Diagnosis Date    Acne     Allergic rhinitis     Anterior cruciate ligament tear     Left knee.  Metacarpal bone fracture 2007    Right 3rd.  Traumatic injury 2009    Right arm laceration, median nerve laceration, brachial artery laceration with multiple surgeries.        Status EXAM:  Status and Exam  Normal: No  Facial Expression: Avoids Gaze, Flat  Affect: Blunt  Level of Consciousness: Confused  Mood:Normal: No  Mood: Anxious, Suspicious, Labile  Motor Activity:Normal: No  Motor Activity: Unusual Posture/Gait  Interview Behavior: Evasive, Impulsive  Preception: [place] to Person, [place] to Place  Attention:Normal: No  Attention: Unable to Concentrate  Thought Processes: Loose Assoc., Blocking  Thought Content:Normal: No  Thought Content: Poverty of Content  Hallucinations: Unable to assess  Delusions: No  Memory:Normal: No  Memory: Poor Recent, Poor Remote, Confabulation  Insight and Judgment: No  Insight and Judgment: Poor Judgment, Poor Insight, Unrealistic  Present Suicidal Ideation: No  Present Homicidal Ideation: No    Tobacco Screening:  Practical Counseling, on admission, sera X, if applicable and completed (first 3 are required if patient doesn't refuse):            ( )  Recognizing danger situations (included triggers and roadblocks)                    ( )  Coping skills (new ways to manage stress, exercise, relaxation techniques, changing routine, distraction)                                                           ( )  Basic information about quitting (benefits of quitting, techniques in how to quit, available resources  ( ) Referral for counseling faxed to [facility]                                           ( x) Patient refused counseling  ( ) Patient has not smoked in the last 30 days    Metabolic Screening:    Lab Results   Component Value Date    LABA1C 4.7 11/11/2018       Lab Results   Component Value Date    CHOL 241 (H) 11/11/2018     Lab Results   Component Value Date    TRIG 125 11/11/2018     Lab Results   Component Value Date    HDL 73 11/11/2018     No components found for: LDLCAL  No results found for: LABVLDL      Body mass index is 21.79 kg/m². BP Readings from Last 2 Encounters:   05/31/21 (!) 119/91   01/03/20 131/85           Pt admitted with followings belongings:  Dentures: None  Vision - Corrective Lenses: None  Hearing Aid: None  Jewelry: Ring  Body Piercings Removed: N/A  Clothing: Pants, Undergarments (Comment)  Were All Patient Medications Collected?: Not Applicable  Other Valuables: None     Valuables sent home with n/a. Valuables placed in safe in security envelope, number:  [id number]. Patient's home medications were n/a. Patient oriented to surroundings and program expectations and copy of patient rights given. Received admission packet:  yes. Consents reviewed, signed refused. Refused to sign in Patient verbalize understanding:  yes. Patient education on precautions: yes  Patient Involuntary from [facility] ER to [facility] unit room 227 bed1 for Hallucinations,agitation,confused,manic and being off medications for more than 3 months. Patient refused to sign any consents forms,but was pleasant,denies all and did not remember what he was here for.                   Caitlin Martínez RN

## 2021-06-01 NOTE — PLAN OF CARE
Problem: Tobacco Use:  Goal: Inpatient tobacco use cessation counseling participation  Description: Inpatient tobacco use cessation counseling participation  6/1/2021 1752 by Leonardo David LPN  Outcome: Ongoing   Patient uses Nicorette gum for tobacco cravings   Problem: Altered Mood, Psychotic Behavior:  Goal: Able to verbalize reality based thinking  Description: Able to verbalize reality based thinking  6/1/2021 1752 by Leonardo David LPN  Outcome: Ongoing   Michael Colbert is seen in the dayroom affect is brightened his mood is restless, has no scheduled medications at this time, states he slept good last night. Unsure of why he is here. cooperative with staff, paces unit 15 minute safety checks continue

## 2021-06-01 NOTE — GROUP NOTE
Group Therapy Note    Date: 6/1/2021    Group Start Time: 1330  Group End Time: 7515  Group Topic: Cognitive Skills    MANDI Shafer    Pt did not attend 1330 cognitive skills group d/t resting in room despite staff invitation to attend. 1:1 talk time offered as alternative to group session, pt declined.               Signature:  Raul Robles

## 2021-06-02 PROCEDURE — 99232 SBSQ HOSP IP/OBS MODERATE 35: CPT | Performed by: PSYCHIATRY & NEUROLOGY

## 2021-06-02 PROCEDURE — 6370000000 HC RX 637 (ALT 250 FOR IP): Performed by: PSYCHIATRY & NEUROLOGY

## 2021-06-02 PROCEDURE — APPSS30 APP SPLIT SHARED TIME 16-30 MINUTES: Performed by: NURSE PRACTITIONER

## 2021-06-02 PROCEDURE — 1240000000 HC EMOTIONAL WELLNESS R&B

## 2021-06-02 RX ORDER — DIVALPROEX SODIUM 500 MG/1
500 TABLET, EXTENDED RELEASE ORAL 2 TIMES DAILY
Status: DISCONTINUED | OUTPATIENT
Start: 2021-06-02 | End: 2021-06-03 | Stop reason: HOSPADM

## 2021-06-02 RX ADMIN — HYDROXYZINE HYDROCHLORIDE 50 MG: 50 TABLET, FILM COATED ORAL at 16:10

## 2021-06-02 RX ADMIN — HYDROXYZINE HYDROCHLORIDE 50 MG: 50 TABLET, FILM COATED ORAL at 10:04

## 2021-06-02 RX ADMIN — HYDROXYZINE HYDROCHLORIDE 50 MG: 50 TABLET, FILM COATED ORAL at 22:08

## 2021-06-02 NOTE — GROUP NOTE
Group Therapy Note    Date: 6/2/2021    Group Start Time: 1000  Group End Time: 1030  Group Topic: Psychotherapy    GABRIEL PHIPPSJOSE FRED Andrade        Group Therapy Note    Attendees:8/13         Patient's Goal:  PT will demonstrate increased interpersonal interaction and a clear understanding on multiple types of coping skills relating to the here-and-now therapeutic practice. Notes:  Pt had difficulty focusing during group discussion. He would often interrupt other group members and attempted to monopolize group discussion. Status After Intervention:  Improved    Participation Level: Active Listener and Monopolizing    Participation Quality: Attentive, Sharing and Inappropriate      Speech:  pressured      Thought Process/Content: Flight of ideas      Affective Functioning: Blunted      Mood: euphoric      Level of consciousness:  Alert, Oriented x4 and Attentive      Response to Learning: Able to verbalize/acknowledge new learning and Progressing to goal      Endings: None Reported    Modes of Intervention: Support, Socialization, Exploration, Clarifying and Problem-solving      Discipline Responsible: /Counselor      Signature:   Lucas Andrade

## 2021-06-02 NOTE — PROGRESS NOTES
awake   Appearance: Appropriate attire for setting, seated in chair, with good  grooming and hygiene   Behavior/Motor: Approachable, psychomotor agitation  Attitude toward examiner: Mostly cooperative, attentive, intense eye contact  Speech: spontaneous, rapid, hyperverbal and interrupting   Mood: \"Great\"  Affect: Elevated  Thought processes: overabundance of ideas and tangential   Thought content:  Denies homicidal ideation  Suicidal Ideation: Denies suicidal ideations, contracts for safety on the unit. Delusions: Grandiose, paranoid  Perceptual Disturbance: Denies, patient does not appear to be responding to internal stimuli. Cognition: Oriented to self, location, time, and situation  Memory: impaired   Insight & Judgement: poor     Data   height is 5' 6\" (1.676 m) and weight is 135 lb (61.2 kg). His oral temperature is 98.2 °F (36.8 °C). His blood pressure is 137/75 and his pulse is 90. His respiration is 16 and oxygen saturation is 99%. Labs:   No visits with results within 2 Day(s) from this visit. Latest known visit with results is:   Hospital Outpatient Visit on 03/03/2020   Component Date Value Ref Range Status    Lithium Lvl 03/03/2020 0.5* 0.6 - 1.2 mmol/L Final    Lithium Dose Amount 03/03/2020 NOT REPORTED   Final    Lithium Date Last Dose 03/03/2020 [id number]   Final    Lithium Dose Time 03/03/2020 2100   Final    TSH 03/03/2020 1.69  0.30 - 5.00 mIU/L Final         Reviewed patient's current plan of care and vital signs with nursing staff.     Labs reviewed: [x] Yes  Last EKG in EMR reviewed: [] Yes    Medications  Current Facility-Administered Medications: nicotine polacrilex (NICORETTE) gum 2 mg, 2 mg, Oral, PRN  acetaminophen (TYLENOL) tablet 650 mg, 650 mg, Oral, Q4H PRN  ibuprofen (ADVIL;MOTRIN) tablet 400 mg, 400 mg, Oral, Q6H PRN  aluminum & magnesium hydroxide-simethicone (MAALOX) 200-200-20 MG/5ML suspension 30 mL, 30 mL, Oral, Q6H PRN  polyethylene glycol (GLYCOLAX) packet 17 g, 17 a glass window leading to Deana Henle infection. The patient continues to report feeling great and is refusing all medications. He thinks the Atarax which is taken as needed is all that he needs. The patient was interested in ECT after he was told about it by a peer. We discussed that ECT is used when medications are not effective and the effects of ECT tend not to last very long. He does come with us risk of some memory difficulties. The patient is not interested in trying anything that would cause her memory difficulties. The patient initially agreed to try Depakote by itself and then declined. He is refusing to sign in to be a voluntary patient  PLAN  Will order Depakote 500 mg twice daily  Attempt to develop insight  Psycho-education conducted. Supportive Therapy conducted.   Probable discharge is as noted above  Follow-up daily while on inpatient unit    Electronically signed by Rm Bunch MD on 6/2/21 at 5:06 PM EDT

## 2021-06-02 NOTE — FLOWSHEET NOTE
*Patient participated in the [facility]       06/02/21 1539   Encounter Summary   Services provided to: Patient   Referral/Consult From: Rounding   Continue Visiting   (6/2/21)   Complexity of Encounter Moderate   Length of Encounter 30 minutes   Spiritual Assessment Completed Yes   Spiritual/Mandaen   Type Spiritual support   Assessment Calm; Approachable   Intervention Active listening;Prayer   Outcome Receptive;Engaged in conversation;Expressed gratitude

## 2021-06-02 NOTE — GROUP NOTE
Group Therapy Note    Date: 6/2/2021    Group Start Time: 1100  Group End Time: 6109  Group Topic: Cognitive Skills    [facility]    MANDI Jeffery        Group Therapy Note    Attendees: 7/13         Patient's Goal:  To increase social interaction and to practice decision making, and communication skills        Notes: Pt came to group late, for last 20 mins only but participated fully in group when in group. Pt was able to practice decision making, communication skills . Pt is pleasant and supportive of peers but does need redirection x3 to focus on task rather than engage in distracting side conversations- including yelling out to a peer in day room to \"Come over and talk, bro\". Pt is accepting of redirection and polite during group. Status After Intervention:  Improved         Participation Level: Active Listener and Interactive     Participation Quality:  Attentive with prompts, Sharing and Supportive        Speech:         Thought Process/Content: Logical r/t task, needs prompts x3 to stay on task     Affective Functioning: Expanded around others     Mood: hypomanic        Level of consciousness:  Alert,  and Attentive        Response to Learning: Able to verbalize current knowledge/experience, Able to verbalize/acknowledge new learning ,  and Progressing to goal        Endings: None Reported     Modes of Intervention: Education, Support, Socialization, Exploration, Clarifying and Problem-solving        Discipline Responsible: Psychoeducational Specialist      Signature:  Claudia Eisenberg

## 2021-06-02 NOTE — PLAN OF CARE
[address] [place] [address]  Day 3 Interdisciplinary Treatment Plan NOTE    Review Date & Time: 6/2/2021  1319    Admission Type:   Admission Type: Involuntary    Reason for admission:  Reason for Admission: Hallucinations,agitation,confused,manic off medications  Estimated Length of Stay: 5-7 days  Estimated Discharge Date Update: to be determined by physician    PATIENT STRENGTHS:  Patient Strengths Strengths: Positive Support  Patient Strengths and Limitations:Limitations: Tendency to isolate self, External locus of control, Multiple barriers to leisure interests  Addictive Behavior:Addictive Behavior  In the past 3 months, have you felt or has someone told you that you have a problem with:  : None  Do you have a history of Chemical Use?: No  Do you have a history of Alcohol Use?: No  Do you have a history of Street Drug Abuse?: Yes  Histroy of Prescripton Drug Abuse?: No  Medical Problems:  Past Medical History:   Diagnosis Date    Acne     Allergic rhinitis     Anterior cruciate ligament tear     Left knee. Metacarpal bone fracture 2007    Right 3rd. Traumatic injury 2009    Right arm laceration, median nerve laceration, brachial artery laceration with multiple surgeries. Risk:  Fall RiskTotal: 73  Jai Scale Jai Scale Score: 22  BVC Total: 2  Change in scores no Changes to plan of Care no    Status EXAM:   Status and Exam  Normal: No  Facial Expression: Elevated, Exaggerated  Affect: Blunt, Unstable  Level of Consciousness: Alert  Mood:Normal: No  Mood: Anxious, Labile  Motor Activity:Normal: Yes  Motor Activity: Repetitive Acts (pacing)  Interview Behavior: Cooperative, Impulsive  Preception: [place] to Person, [place] to Time, [place] to Place, [place] to Situation  Attention:Normal: No  Attention: Distractible  Thought Processes: Blocking, Loose Assoc.   Thought Content:Normal: No  Thought Content: Delusions, Preoccupations  Hallucinations: None  Delusions: Yes  Delusions: Grandeur Memory:Normal: No  Memory: Poor Recent, Poor Remote  Insight and Judgment: No  Insight and Judgment: Poor Insight, Poor Judgment, Unrealistic  Present Suicidal Ideation: No  Present Homicidal Ideation: No    Daily Assessment Last Entry:   Daily Sleep (WDL): Within Defined Limits         Patient Currently in Pain: Denies  Daily Nutrition (WDL): Within Defined Limits    Patient Monitoring:  Frequency of Checks: 4 times per hour, close    Psychiatric Symptoms:   Depression Symptoms  Depression Symptoms: No problems reported or observed.   Anxiety Symptoms  Anxiety Symptoms: Generalized, Ritualistic behavior  Isabelle Symptoms  Isabelle Symptoms: Pressured speech, Poor judgment, Grandiosity, Labile     Psychosis Symptoms  Delusion Type: Grandeur    Suicide Risk CSSR-S:  1) Within the past month, have you wished you were dead or wished you could go to sleep and not wake up? : No  2) Have you actually had any thoughts of killing yourself? : No  6) Have you ever done anything, started to do anything, or prepared to do anything to end your life?: No  Change in Result no Change in Plan of care no       EDUCATION:   EDUCATION:   Learner Progress Toward Treatment Goals: Reviewed results and recommendations of this team, Reviewed group plan and strategies, Reviewed signs, symptoms and risk of self harm and violent behavior, Reviewed goals and plan of care    Method:small group, individual verbal education    Outcome:verbalized by patient, but needs reinforcement to obtain goals    PATIENT GOALS:  Short term: discharge planning   Long term: go to apts / follow up, volunteer at [employer]    PLAN/TREATMENT RECOMMENDATIONS UPDATE: continue with group therapies, increased socialization, continue planning for after discharge goals, continue with medication compliance    SHORT-TERM GOALS UPDATE:   Time frame for Short-Term Goals: 5-7 days    LONG-TERM GOALS UPDATE:   Time frame for Long-Term Goals: 6 months  Members Present in Team Meeting: See Signature Sheet    DELROY Vargas Sender

## 2021-06-02 NOTE — GROUP NOTE
Group Therapy Note    Date: 6/2/2021    Group Start Time: 0900  Group End Time: 1135  Group Topic: Community Meeting    MANDI Duval        Group Therapy Note    Attendees: 8         Patient's Goal:  To improve goal setting/ improve decision making skills     Notes:   Pt was manic and hyeprverbal. Pt goal is to make calls to spiritual advisors    Status After Intervention:  unchanged    Participation Level: intrusive/ disruptive    Participation Quality: needs multiple redirections      Speech: loud, hyperverbal      Thought Process/Content flight of ideas      Affective Functioning: Congruent      Mood: animated       Level of consciousness:  Alert and Oriented x4      Response to Learning:  Progressing to goal      Endings: None Reported    Modes of Intervention: Education, Support and Problem-solving      Discipline Responsible: Psychoeducational Specialist      Signature:  David Frances

## 2021-06-02 NOTE — PLAN OF CARE
Problem: Tobacco Use:  Goal: Inpatient tobacco use cessation counseling participation  Description: Inpatient tobacco use cessation counseling participation  6/2/2021 1653 by Tricia Dandy, RN  Outcome: Ongoing  Note: Patient declines tobacco use cessation counseling. Problem: Altered Mood, Psychotic Behavior:  Goal: Able to demonstrate trust by eating, participating in treatment and following staff's direction  Description: Able to demonstrate trust by eating, participating in treatment and following staff's direction  6/2/2021 1653 by Tricia Dandy, RN  Outcome: Ongoing  Note: Patient is admitting to having anxiety, denies depression, suicidal and homicidal thoughts, denies hallucinations. He is tangential and hyper-verbal, requires frequent redirection. He has utilized his PRN atarax twice today and reports it helps him calm down. Appetite is within normal limits, he reports good sleep, has good hygiene. Reports he has a hard time focusing and recognizes that he is anxious. Cooperative, med compliant. Attends groups. Patient safety maintained through Q15 min and random safety checks. Problem: Altered Mood, Psychotic Behavior:  Goal: Able to verbalize decrease in frequency and intensity of hallucinations  Description: Able to verbalize decrease in frequency and intensity of hallucinations  6/2/2021 1653 by Tricia Dandy, RN  Outcome: Ongoing  Note: Patient denies hallucinations. Problem: Altered Mood, Psychotic Behavior:  Goal: Able to verbalize reality based thinking  Description: Able to verbalize reality based thinking  6/2/2021 1653 by Tricia Dandy, RN  Outcome: Ongoing  Note: Patient is alert and orient x 4. Problem: Altered Mood, Psychotic Behavior:  Goal: Absence of self-harm  Description: Absence of self-harm  6/2/2021 1653 by Tricia Dandy, RN  Outcome: Ongoing  Note: Patient remains free of self harm.      Problem: Altered Mood, Psychotic Behavior:  Goal: Ability to interact with

## 2021-06-02 NOTE — PROGRESS NOTES
Pharmacy Med Education Group Note    Date: 6/2/21  Start Time: 1430  End Time: 8142    Number Participants in Group:  5    Goal:  Patient will demonstrate an understanding of the medications intended purpose and possible adverse effects  Topic: [place] for Pharmacy Med Ed Group    Discipline Responsible:     OT  AT  [facility].  RT     X Other       Participation Level:     None  Minimal      X Active Listener    X Interactive    Monopolizing         Participation Quality:    X Appropriate  Inappropriate     X       Attentive        Intrusive          Sharing        Resistant          Supportive        Lethargic       Affective:     X Congruent  Incongruent  Blunted  Flat    Constricted  Anxious  Elated  Angry    Labile  Depressed  Other         Cognitive:    X Alert  Oriented PPTP     Concentration   X G  F  P   Attention Span   X G  F  P   Short-Term Memory   X G  F  P   Long-Term Memory  G  F  P   ProblemSolving/  Decision Making  G  F  P   Ability to Process  Information   X G  F  P      Contributing Factors             Delusional             Hallucinating             Flight of Ideas             Other:       Modes of Intervention:    X Education   X Support  Exploration    Clarifying  Problem Solving  Confrontation    Socialization  Limit Setting  Reality Testing    Activity  Movement  Media    Other:            Response to Learning:    X Able to verbalize current knowledge/experience    Able to verbalize/acknowledge new learning    Able to retain information    Capable of insight    Able to change behavior    Progressing to goal    Other:        Armando Bruno, [place]  PGY-2 Ambulatory Care Resident Pharmacist  6/2/2021  3:21 PM

## 2021-06-02 NOTE — PLAN OF CARE
Problem: Tobacco Use:  Goal: Inpatient tobacco use cessation counseling participation  Description: Inpatient tobacco use cessation counseling participation  6/1/2021 2245 by Jaylin Beard RN  Outcome: Ongoing  Patient refused tobacco use cessation counseling. Problem: Altered Mood, Psychotic Behavior:  Goal: Able to demonstrate trust by eating, participating in treatment and following staff's direction  Description: Able to demonstrate trust by eating, participating in treatment and following staff's direction  Outcome: Ongoing  Patient was seen eating, drinking, and participating in treatment at his own expense. Patient made a scene during the evening due to the fact that he did not come out and eat during mealtime and he was demanding that the staff produced [id number] for him. Patient stated that he was going to make a scene until he got what he wanted. Goal: Able to verbalize decrease in frequency and intensity of hallucinations  Description: Able to verbalize decrease in frequency and intensity of hallucinations  Outcome: Ongoing  Patient denied any type of hallucinations. Goal: Able to verbalize reality based thinking  Description: Able to verbalize reality based thinking  6/1/2021 2245 by Jaylin Beard RN  Outcome: Ongoing  Patient unable to verbalize reality based thinking. Patient was having grandeur delusions and was having bizarre thoughts and conversations with staff and peers. Goal: Absence of self-harm  Description: Absence of self-harm  Outcome: Ongoing  Patient absent of self-harm. Patient denied having thoughts of self-harm. Patient observed every 15 minutes and as needed for safety and environmental factors. Goal: Ability to interact with others will improve  Description: Ability to interact with others will improve  Outcome: Ongoing  Patient is able to converse with staff and peers but in bizarre ways.  Patients have verbalized to writer that they do not like to talk to him due to

## 2021-06-03 VITALS
OXYGEN SATURATION: 99 % | DIASTOLIC BLOOD PRESSURE: 77 MMHG | TEMPERATURE: 97.9 F | BODY MASS INDEX: 21.69 KG/M2 | SYSTOLIC BLOOD PRESSURE: 108 MMHG | HEIGHT: 66 IN | RESPIRATION RATE: 14 BRPM | WEIGHT: 135 LBS | HEART RATE: 72 BPM

## 2021-06-03 PROCEDURE — 6370000000 HC RX 637 (ALT 250 FOR IP): Performed by: PSYCHIATRY & NEUROLOGY

## 2021-06-03 PROCEDURE — 99239 HOSP IP/OBS DSCHRG MGMT >30: CPT | Performed by: PSYCHIATRY & NEUROLOGY

## 2021-06-03 RX ADMIN — HYDROXYZINE HYDROCHLORIDE 50 MG: 50 TABLET, FILM COATED ORAL at 08:17

## 2021-06-03 RX ADMIN — HYDROXYZINE HYDROCHLORIDE 50 MG: 50 TABLET, FILM COATED ORAL at 14:31

## 2021-06-03 RX ADMIN — NICOTINE POLACRILEX 2 MG: 2 GUM, CHEWING BUCCAL at 09:24

## 2021-06-03 RX ADMIN — NICOTINE POLACRILEX 2 MG: 2 GUM, CHEWING BUCCAL at 10:57

## 2021-06-03 NOTE — BH NOTE
Patient given tobacco quitline number 14510575164 at this time, refusing to call at this time, states \" I just dont want to quit now\"- patient given information as to the dangers of long term tobacco use. Continue to reinforce the importance of tobacco cessation.

## 2021-06-03 NOTE — SUICIDE SAFETY PLAN
SAFETY PLAN    A suicide Safety Plan is a document that supports someone when they are having thoughts of suicide. Warning Signs that indicate a suicidal crisis may be developing: What (situations, thoughts, feelings, body sensations, behaviors, etc.) do you experience that lets you know you are beginning to think about suicide? 1. Go off medications  2. Mood is depressed and start to feel sad, hopeless, helpless, guilty, decline in self-esteem, excess worry, no interest in doing any pleasurable activities, unable to concentrate  3. Begin to cry over the smallest of things  4. Not eating or sleeping as normal  5. Relationship issues start happening  6. I become angry and start a fight  7. When I dont listen or respond to people in a good, positive way  8. Increase drug use   Internal Coping Strategies:  What things can I do (relaxation techniques, hobbies, physical activities, etc.) to take my mind off my problems without contacting another person? 1. Go to hospital discharge appointments and follow-up with community mental health counseling  2. Talk with other people  3. Learn to identify and control your emotions by new ways  4. Think before you speak or act; walk away from the situation  5. Join a support group in person or on Social Media  6. Take a time-out  7. Take deep breaths; use relaxation techniques  8. Get some exercise; go for a walk  9. Read; listen to music; watch a funny movie   People whom I can ask for help: Who can I call when I need help - for example, friends, family, clergy, someone else? [address] [place], [address]   [phone]    [address], [address]  Phone: [phone]  Professionals or [address] [place] [address] I can contact during a crisis: Who can I call for help - for example, my doctor, my psychiatrist, my psychologist, a mental health provider, a suicide hotline? [address]   [place] [address]   [phone]    [address] [facility], [address]  Phone: [phone]  4. [address] [facility], [facility] -  for example, [place] suicide hotline,    Suicide Prevention Lifeline: 2-828-052-TALK (8954)  [employer] [facility] 2-1-1 (386) 719-3642 or (428) 630-6316  Rescue [address], 24-hour Crisis Services, Central Access: [phone] [phone], [address], [address]  MAR (National Association of Mental Illness) groups and support, [address] [facility] [address] [phone]    Making the environment safe: How can I make my environment (house/apartment/living space) safer? For example, can I remove guns, medications, and other items? 1. Throw away all medications not being taken  2.  Plan daily goals to help remember to stay on specific medications

## 2021-06-03 NOTE — GROUP NOTE
Group Therapy Note    Date: 6/3/2021    Group Start Time: 0900  Group End Time: 1200  Group Topic: Community Meeting    GABRIEL Connor, CTRS        Group Therapy Note    Attendees: 6         Patient's Goal:  To improve goal setting/ improve decision making skills     Notes:   Pt was pleasant and set goal of asking for discharge     Status After Intervention unchanged     Participation Level:  Active Listener and Interactive    Participation Quality: Appropriate and Supportive      Speech:  normal      Thought Process/Content: flight of ideas      Affective Functioning: Congruent      Mood: manic      Level of consciousness:  Alert and Oriented x4      Response to Learning: Able to verbalize current knowledge/experience and Progressing to goal      Endings: None Reported    Modes of Intervention: Education, Support and Problem-solving      Discipline Responsible: Psychoeducational Specialist      Signature:  Kevin Daigle

## 2021-06-03 NOTE — BH NOTE
[facility]  Discharge Note    Pt discharged with followings belongings:   Dentures: None  Vision - Corrective Lenses: None  Hearing Aid: None  Jewelry: Ring  Body Piercings Removed: N/A  Clothing: Pants, Undergarments (Comment)  Were All Patient Medications Collected?: Not Applicable  Other Valuables: None   Valuables sent home with patient. Valuables retrieved from safe, Security envelope number:  [id number] and returned to patient. Patient education on aftercare instructions: yes, appointments and meds  Information faxed to [facility] by nurse Patient verbalize understanding of AVS:  Yes. Discharged to private residence.     Status EXAM upon discharge:  Status and Exam  Normal: No  Facial Expression: Elevated, Brightened, Exaggerated  Affect: Blunt, Unstable  Level of Consciousness: Alert  Mood:Normal: No  Mood: Anxious, Labile, Suspicious  Motor Activity:Normal: Yes  Motor Activity: Increased  Interview Behavior: Cooperative, Impulsive  Preception: [place] to Person, Jillene Pry to Time, [place] to Place, [place] to Situation  Attention:Normal: No  Attention: Distractible  Thought Processes: Flt.of Ideas, Loose Assoc., Tangential  Thought Content:Normal: No  Thought Content: Preoccupations  Hallucinations: None  Delusions: No  Delusions: Grandeur  Memory:Normal: No  Memory: Poor Recent, Poor Remote  Insight and Judgment: No  Insight and Judgment: Poor Judgment, Poor Insight  Present Suicidal Ideation: No  Present Homicidal Ideation: No      Metabolic Screening:    Lab Results   Component Value Date    LABA1C 4.7 11/11/2018       Lab Results   Component Value Date    CHOL 241 (H) 11/11/2018     Lab Results   Component Value Date    TRIG 125 11/11/2018     Lab Results   Component Value Date    HDL 73 11/11/2018     No components found for: LDLCAL  No results found for: Sarah Iyer LPN

## 2021-06-03 NOTE — GROUP NOTE
Group Therapy Note    Date: 6/3/2021    Group Start Time: 1330  Group End Time: 9639  Group Topic: Relaxation    [facility] D    Anand Pena, CTRS    Pt did not attend relaxation group d/t resting in room despite staff invitation to attend. 1:1 talk time offered as alternative to group session, pt declined.             Signature:  Olga Ni

## 2021-06-03 NOTE — GROUP NOTE
Group Therapy Note    Date: 6/3/2021    Group Start Time: 1000  Group End Time: 5683  Group Topic: Psychotherapy    [facility] D    Venkat Roy        Group Therapy Note    Attendees: 7/10         Patient's Goal:  PT will demonstrate increased interpersonal interaction and a clear understanding on multiple types of coping skills relating to the here-and-now therapeutic practice. Notes:  Patient is making progress, AEB participating in group discussion, actively listening, and supporting other group members. PT participates in group and encourages others to participate     Status After Intervention:  Improved    Participation Level: Active Listener and Interactive    Participation Quality: Appropriate, Attentive and Sharing      Speech:  normal      Thought Process/Content: Logical  Flight of ideas      Affective Functioning: Exaggerated      Mood: elevated      Level of consciousness:  Alert, Oriented x4 and Attentive      Response to Learning: Able to verbalize/acknowledge new learning and Progressing to goal      Endings: None Reported    Modes of Intervention: Support, Socialization, Exploration, Clarifying and Problem-solving      Discipline Responsible: /Counselor      Signature:   Venkat Roy

## 2021-06-03 NOTE — SUICIDE SAFETY PLAN
SAFETY PLAN    A suicide Safety Plan is a document that supports someone when they are having thoughts of suicide. Warning Signs that indicate a suicidal crisis may be developing: What (situations, thoughts, feelings, body sensations, behaviors, etc.) do you experience that lets you know you are beginning to think about suicide? 1. Go off medications  2. Mood is depressed and start to feel sad, hopeless, helpless, guilty, decline in self-esteem, excess worry, no interest in doing any pleasurable activities, unable to concentrate  3. Begin to cry over the smallest of things  4. Not eating or sleeping as normal  5. Relationship issues start happening  6. I become angry and start a fight  7. When I dont listen or respond to people in a good, positive way  8. Increase drug use      Internal Coping Strategies:  What things can I do (relaxation techniques, hobbies, physical activities, etc.) to take my mind off my problems without contacting another person? 1. Go to hospital discharge appointments and follow-up with community mental health counseling  2. Talk with other people  3. Learn to identify and control your emotions by new ways  4. Think before you speak or act; walk away from the situation  5. Join a support group in person or on Social Media  6. Take a time-out  7. Take deep breaths; use relaxation techniques  8. Get some exercise; go for a walk  9. Read; listen to music; watch a funny movie    10. Coping skills/ strategies  journal/ listen to music/ go for a walk/ read a book/ watch a funny movie/show / crafts / video game   11.  Grounding techniques- eat a sour candy or hot cinnamon candy / focus on colors, sounds, smells, textures on things around you / drink some herbal tea / eat a piece of dark chocolate / take a hot bath or shower / essential oils for smelling / meditate / color / arts and crafts    People whom I can ask for help: Who can I call when I need help - for example, friends, family, clergy, someone else? 1. Friends and family members    Professionals or [address] I can contact during a crisis: Who can I call for help - for example, my doctor, my psychiatrist, my psychologist, a mental health provider, a suicide hotline? 1. [facility] for therapy and if medications are needed  2. Suicide Prevention Lifeline: 7-149-352-TALK (7263)     [address] [place] [address] Emergency Services - for example, [address] suicide hotline,   1. National Association of Mental Illness, 4-279.422.2688  2. [facility] Substance Abuse National Helpline, 7-681-603-HELP (6280)  3. Crisis Text Line, Text 4HOPE to 079071 to connect with a crisis counselor  4. [facility], 5-669.967.3018  5. LION ([address]), 3-430.634.7020  6. [employer] (Alcohol / Drug help)  7. Call the Recovery Helpline at 51 622 083 (24 hours a day - 7 days a week)  8. COVID-19 Emotional Support Line: 544.505.5643    Making the environment safe: How can I make my environment (house/apartment/living space) safer? For example, can I remove guns, medications, and other items? 1. Remove unsafe objects  2. Keep Medications in safe and secure location  3.  Plan daily goals to help remember to stay on specific medications

## 2021-06-03 NOTE — CARE COORDINATION
Name: Ada Lorenzana    : 1995    Discharge Date: 6/3/2021    Primary Auth/Cert #: [id number]    Destination: home    Discharge Medications:      Medication List      CONTINUE taking these medications    lithium 300 MG tablet  Notes to patient: Mood/clears thoughts     paliperidone palmitate  MG/ML Lula IM injection  Commonly known as: INVEGA SUSTENNA  Inject 156 mg into the muscle every 28 days  Notes to patient: Mood/clears thoughts            Follow Up Appointment: [address] [place] [address]  Phone: [phone]  Fax: [phone] on 2021  Melony Pope, you have a diagnostic assessment in [place] office on  at 1:30pm    Discharge to address on face-sheet:  [address] [place] [address] [address]  Go on 6/3/2021  If client does not have a ride home, please send home by [place] [employer] transportation    [address] [facility], [address]  Phone: [phone]  Fax: [phone]    Go on 6/10/2021  You have an appointment for a hospital discharge and to get re-linked for services on Thursday, Kriss 10 at 11:00am.  Once this appointment is completed, you will be transferred to the [place] area    THOMAS [address]  [address]   [facility] [address]   [phone]    Schedule an appointment as soon as possible for a visit
Pt reports being linked with [facility] in [facility] in the past, but presents with a history of non-compliance with treatment. Pt lives in [place], [place].

## 2021-06-03 NOTE — GROUP NOTE
Group Therapy Note    Date: 6/3/2021    Group Start Time: 1100  Group End Time: 6722  Group Topic: Cognitive Skills     [facility] D    MANDI Baird        Group Therapy Note    Attendees: 6       Patient's Goal:  To improve concentration/ improve communication skills       Status After Intervention:  unchanged    Participation Level:  Active Listener and Interactive    Participation Quality: Appropriate and Supportive      Speech:  hyperverbal       Thought Process/Content: flight of ideas      Affective Functioning: animated      Mood: manic    Level of consciousness:  Alert       Response to Learning: Able to verbalize current knowledge/experience and Progressing to goal      Endings: None Reported    Modes of Intervention: Education, Support and Problem-solving      Discipline Responsible: Psychoeducational Specialist      Signature:  Nakul Mckeon
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
## 2021-06-04 ENCOUNTER — TELEPHONE (OUTPATIENT)
Dept: FAMILY MEDICINE CLINIC | Age: 26
End: 2021-06-04
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
## 2021-09-15 ENCOUNTER — HOSPITAL ENCOUNTER (OUTPATIENT)
Dept: GENERAL RADIOLOGY | Age: 26
Discharge: HOME | End: 2021-09-17
Payer: MEDICARE

## 2021-09-15 ENCOUNTER — OFFICE VISIT (OUTPATIENT)
Dept: PRIMARY CARE CLINIC | Age: 26
End: 2021-09-15
Payer: MEDICARE

## 2021-09-15 VITALS
WEIGHT: 135 LBS | SYSTOLIC BLOOD PRESSURE: 106 MMHG | HEART RATE: 86 BPM | RESPIRATION RATE: 16 BRPM | DIASTOLIC BLOOD PRESSURE: 72 MMHG | HEIGHT: 66 IN | BODY MASS INDEX: 21.69 KG/M2

## 2021-09-15 DIAGNOSIS — M79.675 TOE PAIN, LEFT: Primary | ICD-10-CM

## 2021-09-15 DIAGNOSIS — M79.675 TOE PAIN, LEFT: ICD-10-CM

## 2021-09-15 DIAGNOSIS — S99.922A INJURY OF LEFT GREAT TOE, INITIAL ENCOUNTER: ICD-10-CM

## 2021-09-15 PROCEDURE — 99212 OFFICE O/P EST SF 10 MIN: CPT | Performed by: NURSE PRACTITIONER

## 2021-09-15 PROCEDURE — 73630 X-RAY EXAM OF FOOT: CPT

## 2021-09-15 PROCEDURE — 99213 OFFICE O/P EST LOW 20 MIN: CPT | Performed by: NURSE PRACTITIONER

## 2021-09-15 RX ORDER — HYDROXYZINE HYDROCHLORIDE 25 MG/1
TABLET, FILM COATED ORAL
COMMUNITY
Start: 2021-08-09 | End: 2021-09-15

## 2021-09-15 RX ORDER — MIRTAZAPINE 15 MG/1
TABLET, FILM COATED ORAL
COMMUNITY
Start: 2021-07-04 | End: 2022-03-08

## 2021-09-15 RX ORDER — TRAZODONE HYDROCHLORIDE 100 MG/1
TABLET ORAL
COMMUNITY
Start: 2021-07-04 | End: 2022-03-08

## 2021-09-15 RX ORDER — PREDNISONE 20 MG/1
20 TABLET ORAL 2 TIMES DAILY
Qty: 10 TABLET | Refills: 0 | Status: SHIPPED | OUTPATIENT
Start: 2021-09-15 | End: 2021-09-20

## 2021-09-15 NOTE — PROGRESS NOTES
Subjective:      Patient ID: Arti Hall is a 30 y.o. male coming in for   Chief Complaint   Patient presents with    Foot Pain     was doing jumps and back flips a couple months ago. pain. red and swollen on left foot by the great toe. HPI  Presents to clinic with complaints of left great toe/mcp joint pain. Reports pain ongoing for a few months. Initially injured it while doing jumps/back flips and this is the first time he has had it looked at. Reports instability of the joint and is able to manipulate it to move laterally, where he was not able to before. Review of Systems     Objective:/72 (Site: Left Upper Arm, Position: Sitting, Cuff Size: Medium Adult)   Pulse 86   Resp 16   Ht 5' 6\" (1.676 m)   Wt 135 lb (61.2 kg)   BMI 21.79 kg/m²      Physical Exam  Musculoskeletal:        Feet:           Assessment:      1. Toe pain, left    2. Injury of left great toe, initial encounter           Plan:    prednisone for swelling/pain. Xray negative for any fractures. Pt to follow up with podiatry.      Orders Placed This Encounter   Procedures    XR FOOT LEFT (MIN 3 VIEWS)     Standing Status:   Future     Number of Occurrences:   1     Standing Expiration Date:   9/15/2022     Order Specific Question:   Reason for exam:     Answer:   toe injury while doing gymnastics   [address], [place], [facility]     Referral Priority:   Routine     Referral Type:   Eval and Treat     Referral Reason:   Specialty Services Required     Referred to Provider:   Jessi Sanders DPM     Requested Specialty:   Podiatry     Number of Visits Requested:   1      Outpatient Encounter Medications as of 9/15/2021   Medication Sig Dispense Refill    predniSONE (DELTASONE) 20 MG tablet Take 1 tablet by mouth 2 times daily for 5 days 10 tablet 0    lithium 300 MG tablet Take 600 mg by mouth nightly       mirtazapine (REMERON) 15 MG tablet TAKE 1 TABLET BY MOUTH NIGHTLY FOR 14 DAYS (Patient not taking: Reported on 9/15/2021)      traZODone (DESYREL) 100 MG tablet TAKE 1 TABLET BY MOUTH NIGHTLY AS NEEDED FOR SLEEP FOR UP TO 14 DAYS (Patient not taking: Reported on 9/15/2021)      [DISCONTINUED] hydrOXYzine (ATARAX) 25 MG tablet  (Patient not taking: Reported on 9/15/2021)      [DISCONTINUED] paliperidone palmitate ER (INVEGA SUSTENNA) 156 MG/ML VIVI IM injection Inject 156 mg into the muscle every 28 days (Patient not taking: Reported on 9/15/2021) 1.2 mg 0     No facility-administered encounter medications on file as of 9/15/2021.             Ming John, APRN - CNP

## 2021-09-15 NOTE — PATIENT INSTRUCTIONS
Make follow up appt with podiatry. Take prednisone as prescribed. Avoid excessive activities such as flipping or running until follow up.

## 2021-09-23 DIAGNOSIS — M79.672 CHRONIC FOOT PAIN, LEFT: Primary | ICD-10-CM

## 2021-09-23 DIAGNOSIS — G89.29 CHRONIC FOOT PAIN, LEFT: Primary | ICD-10-CM

## 2021-09-24 ENCOUNTER — HOSPITAL ENCOUNTER (OUTPATIENT)
Dept: MRI IMAGING | Age: 26
Discharge: HOME | End: 2021-09-26
Payer: MEDICARE

## 2021-09-24 DIAGNOSIS — G89.29 CHRONIC FOOT PAIN, LEFT: ICD-10-CM

## 2021-09-24 DIAGNOSIS — M79.672 CHRONIC FOOT PAIN, LEFT: ICD-10-CM

## 2021-09-24 PROCEDURE — 73718 MRI LOWER EXTREMITY W/O DYE: CPT

## 2021-09-27 ENCOUNTER — OFFICE VISIT (OUTPATIENT)
Dept: PODIATRY | Age: 26
End: 2021-09-27
Payer: MEDICARE

## 2021-09-27 VITALS
DIASTOLIC BLOOD PRESSURE: 70 MMHG | WEIGHT: 145 LBS | HEIGHT: 66 IN | HEART RATE: 84 BPM | BODY MASS INDEX: 23.3 KG/M2 | SYSTOLIC BLOOD PRESSURE: 120 MMHG

## 2021-09-27 DIAGNOSIS — M94.9 OSTEOCHONDRAL LESION: Primary | ICD-10-CM

## 2021-09-27 DIAGNOSIS — M19.079 INFLAMMATION OF FOOT JOINT: ICD-10-CM

## 2021-09-27 DIAGNOSIS — M89.9 OSTEOCHONDRAL LESION: Primary | ICD-10-CM

## 2021-09-27 PROCEDURE — 1036F TOBACCO NON-USER: CPT | Performed by: PODIATRIST

## 2021-09-27 PROCEDURE — 99204 OFFICE O/P NEW MOD 45 MIN: CPT | Performed by: PODIATRIST

## 2021-09-27 PROCEDURE — L4386 NON-PNEUM WALK BOOT PRE CST: HCPCS | Performed by: PODIATRIST

## 2021-09-27 PROCEDURE — 99212 OFFICE O/P EST SF 10 MIN: CPT | Performed by: PODIATRIST

## 2021-09-27 PROCEDURE — G8427 DOCREV CUR MEDS BY ELIG CLIN: HCPCS | Performed by: PODIATRIST

## 2021-09-27 PROCEDURE — G8420 CALC BMI NORM PARAMETERS: HCPCS | Performed by: PODIATRIST

## 2021-09-27 RX ORDER — METHYLPREDNISOLONE 4 MG/1
TABLET ORAL
Qty: 1 KIT | Refills: 0 | Status: SHIPPED | OUTPATIENT
Start: 2021-09-27 | End: 2022-03-08

## 2021-09-27 NOTE — PROGRESS NOTES
Subjective:  Roselyn Wells is a 30 y.o. male who presents to the office today complaining of left big toe injury. Patient was doing back flips and felt his toe jam..  Symptoms began 3 month(s) ago. Patient relates pain is Present. Pain is rated 5 out of 10 and is described as waxing and waning, moderate. Treatments prior to today's visit include: X-ray MRI. Steroid at first helped but he hurt his toe again moving a washer and dryer. 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 .  Currently denies F/C/N/V. Allergies   Allergen Reactions    Penicillins Rash    Bee Venom      Stings. Uses EpiPen. Past Medical History:   Diagnosis Date    Acne     Allergic rhinitis     Anterior cruciate ligament tear     Left knee.  Metacarpal bone fracture 2007    Right 3rd.  Traumatic injury 2009    Right arm laceration, median nerve laceration, brachial artery laceration with multiple surgeries. Prior to Admission medications    Medication Sig Start Date End Date Taking? Authorizing Provider   mirtazapine (REMERON) 15 MG tablet TAKE 1 TABLET BY MOUTH NIGHTLY FOR 14 DAYS 7/4/21  Yes Historical Provider, MD   traZODone (DESYREL) 100 MG tablet TAKE 1 TABLET BY MOUTH NIGHTLY AS NEEDED FOR SLEEP FOR UP TO 14 DAYS 7/4/21  Yes Historical Provider, MD   lithium 300 MG tablet Take 600 mg by mouth nightly    Yes Historical Provider, MD       Past Surgical History:   Procedure Laterality Date    ANTERIOR CRUCIATE LIGAMENT REPAIR Right 01/2016    but retore it again in October 2016    ARM SURGERY Right 11/28/2009    Compartment syndrome right forearm, status post primary closure of fasciotomy right forearm.  ARTERY SURGERY Right 11/25/2009    Exploration right axillary brachial artery, right axillary to brachial artery bypass and right axillary to brachial artery venous bypass with greater saphenous vein.     FASCIOTOMY Right 11/25/2009    Fasciotomy right volar forearm.  NERVE SURGERY Right 11/25/2009    Repair of right median nerve, right ulnar nerve, right musculocutaneous nerve and repair of triceps muscle, Dr. Evan Paige, Dr. Pat Gabriel. Family History   Problem Relation Age of Onset    High Cholesterol Mother     Other Mother         Villalba's.  Breast Cancer Mother     Breast Cancer Maternal Aunt     Breast Cancer Maternal Grandmother     Cancer Maternal Grandmother         Bone.  Diabetes Maternal Grandmother     Heart Attack Maternal Grandfather 61    Heart Failure Maternal Grandfather         Congestive heart failure.  Heart Disease Maternal Grandfather     COPD Maternal Grandfather     Diabetes Maternal Grandfather     Hypertension Maternal Grandfather     High Cholesterol Maternal Grandfather     Asthma Maternal Grandfather     Cancer Other         Bone.  Diabetes Other     Other Father         degenertative bone     Arthritis Father     Other Paternal Grandmother        Social History     Tobacco Use    Smoking status: Never Smoker    Smokeless tobacco: Never Used   Substance Use Topics    Alcohol use: No       ROS: All 14 ROS systems reviewed and pertinent positives noted above, all others negative. Lower Extremity Physical Examination:     Vitals:   Vitals:    09/27/21 1624   BP: 120/70   Pulse: 84     General: AAO x 3 in NAD. Vascular: DP and PT pulses palpable 2/4, bilateral.  CFT <3 seconds, bilateral.  Hair growth present to the level of the digits, bilateral.  Edema absent, bilateral.  Varicosities absent, bilateral. Erythema absent, bilateral. Distal Rubor absent bilateral.  Temperature within normal limits bilateral. Hyperpigmentation absent bilateral. No atrophic skin.   Neurological: Sensation intact to light touch to level of digits, bilateral.  Protective sensation intact 10/10 sites via 5.07/10g Doucette-Ej Monofilament, bilateral.  negative Tinel's, bilateral.  negative Valleix sign, bilateral.  Vibratory intact bilateral.  Reflexes Decreased bilateral.  Paresthesias negative. Dysthesias negative. Sharp/dull intact bilateral.    Musculoskeletal: Muscle strength 5/5, bilateral.  Pain present upon palpation L dorsal 1st mpj. Normal medial longitudinal arch, bilateral.  Ankle ROM within normal limits,bilateral.  1st MPJ ROM within normal limits, bilateral.  Dorsally contracted digits absent. No instability left first MPJ. Good strength of left extensor and flexor tendon. No other foot deformities. Integument: Warm, dry, supple, bilateral.  Open lesion absent, bilateral.  Interdigital maceration absent to web spaces bilateral.  Nails within normal limits. Fissures absent, bilateral. Hyperkeratotic tissue is absent. X-ray and MRI see reports. Asessment: Patient is a 30 y.o. male with:    Diagnosis Orders   1. Osteochondral lesion     2. Inflammation of foot joint         Plan: Patient examined and evaluated. Current condition and treatment options discussed in detail. X rays reviewed with the pt in detail. All questions answered. MRI reviewed with the pt in detail. All questions answered. Orders Placed This Encounter   Medications    methylPREDNISolone (MEDROL DOSEPACK) 4 MG tablet     Sig: Take by mouth. Dispense:  1 kit     Refill:  0     Due to level of pain/deformity/condition, it is in my medical opinion that patient will benefit from and is medically necessary for offloading device at this time. Patient was dispensed a cam walker to wear 100% of the time WB. Patient was educated on appropriate use of the device and the need to be compliant with offloading therapy. Patient understands that non compliance with the device will be detrimental to the healing of the condition/ulceration. Patient needs the device to help support the foot and reduce pain. This device is medically necessary due to above listed diagnosis.   Patient has moderate level medical decision making overall. Patient is undiagnosed new problem with uncertain prognosis. Patient has 2 test reviewed from independent physician. Patient moderate risk morbidity overall due to the chronic nature of his condition unrelieved by treatment so far. Patient educated about long-term risk complications of osteochondral lesion of his left great toe joint. On x-ray and MRI appears very stable and she was told with appropriate offloading. Patient was seen back in 3 weeks and for recommendations at that point. Contact office with any questions/problems/concerns. RTC in 3week(s).

## 2021-10-15 ENCOUNTER — TELEPHONE (OUTPATIENT)
Dept: PODIATRY | Age: 26
End: 2021-10-15

## 2021-10-15 NOTE — TELEPHONE ENCOUNTER
Patient no showed appointment with Dr. Lynette Roberts today at 10:15 am. Bell Burch left message on patient's voicemail to reschedule appointment.

## 2022-03-08 ENCOUNTER — OFFICE VISIT (OUTPATIENT)
Dept: PRIMARY CARE CLINIC | Age: 27
End: 2022-03-08
Payer: MEDICARE

## 2022-03-08 VITALS
OXYGEN SATURATION: 98 % | RESPIRATION RATE: 18 BRPM | BODY MASS INDEX: 25.88 KG/M2 | WEIGHT: 161 LBS | HEART RATE: 72 BPM | HEIGHT: 66 IN | TEMPERATURE: 96.9 F

## 2022-03-08 DIAGNOSIS — B02.23 SHINGLES (HERPES ZOSTER) POLYNEUROPATHY: Primary | ICD-10-CM

## 2022-03-08 DIAGNOSIS — F31.2 BIPOLAR DISORDER, CURRENT EPISODE MANIC SEVERE WITH PSYCHOTIC FEATURES (HCC): ICD-10-CM

## 2022-03-08 DIAGNOSIS — B02.23: ICD-10-CM

## 2022-03-08 PROCEDURE — 99213 OFFICE O/P EST LOW 20 MIN: CPT | Performed by: NURSE PRACTITIONER

## 2022-03-08 PROCEDURE — G8419 CALC BMI OUT NRM PARAM NOF/U: HCPCS | Performed by: NURSE PRACTITIONER

## 2022-03-08 PROCEDURE — G8427 DOCREV CUR MEDS BY ELIG CLIN: HCPCS | Performed by: NURSE PRACTITIONER

## 2022-03-08 PROCEDURE — G8484 FLU IMMUNIZE NO ADMIN: HCPCS | Performed by: NURSE PRACTITIONER

## 2022-03-08 PROCEDURE — 99212 OFFICE O/P EST SF 10 MIN: CPT | Performed by: NURSE PRACTITIONER

## 2022-03-08 PROCEDURE — 1036F TOBACCO NON-USER: CPT | Performed by: NURSE PRACTITIONER

## 2022-03-08 RX ORDER — LIDOCAINE 50 MG/G
1 PATCH TOPICAL DAILY
Qty: 10 PATCH | Refills: 0 | Status: SHIPPED | OUTPATIENT
Start: 2022-03-08 | End: 2022-03-18

## 2022-03-08 RX ORDER — QUETIAPINE FUMARATE 100 MG/1
TABLET, FILM COATED ORAL
COMMUNITY
Start: 2022-03-01 | End: 2022-05-06

## 2022-03-08 RX ORDER — HYDROXYZINE HYDROCHLORIDE 25 MG/1
TABLET, FILM COATED ORAL
COMMUNITY
Start: 2021-11-30

## 2022-03-08 RX ORDER — QUETIAPINE FUMARATE 300 MG/1
TABLET, FILM COATED ORAL
COMMUNITY
Start: 2022-03-01 | End: 2022-05-06

## 2022-03-08 RX ORDER — VALACYCLOVIR HYDROCHLORIDE 1 G/1
1000 TABLET, FILM COATED ORAL 3 TIMES DAILY
Qty: 21 TABLET | Refills: 0 | Status: SHIPPED | OUTPATIENT
Start: 2022-03-08 | End: 2022-03-15

## 2022-03-08 RX ORDER — CARBAMAZEPINE 200 MG/1
TABLET ORAL
COMMUNITY
Start: 2022-03-01 | End: 2022-05-06 | Stop reason: ALTCHOICE

## 2022-03-08 RX ORDER — OLANZAPINE 10 MG/1
TABLET ORAL
COMMUNITY
Start: 2022-03-01 | End: 2022-05-06

## 2022-03-08 RX ORDER — NAPROXEN 500 MG/1
500 TABLET ORAL 2 TIMES DAILY WITH MEALS
Qty: 60 TABLET | Refills: 0 | Status: SHIPPED | OUTPATIENT
Start: 2022-03-08 | End: 2022-05-06

## 2022-03-08 ASSESSMENT — PATIENT HEALTH QUESTIONNAIRE - PHQ9
4. FEELING TIRED OR HAVING LITTLE ENERGY: 2
SUM OF ALL RESPONSES TO PHQ QUESTIONS 1-9: 0
6. FEELING BAD ABOUT YOURSELF - OR THAT YOU ARE A FAILURE OR HAVE LET YOURSELF OR YOUR FAMILY DOWN: 0
3. TROUBLE FALLING OR STAYING ASLEEP: 0
SUM OF ALL RESPONSES TO PHQ QUESTIONS 1-9: 3
5. POOR APPETITE OR OVEREATING: 0
2. FEELING DOWN, DEPRESSED OR HOPELESS: 0
10. IF YOU CHECKED OFF ANY PROBLEMS, HOW DIFFICULT HAVE THESE PROBLEMS MADE IT FOR YOU TO DO YOUR WORK, TAKE CARE OF THINGS AT HOME, OR GET ALONG WITH OTHER PEOPLE: 0
1. LITTLE INTEREST OR PLEASURE IN DOING THINGS: 0
2. FEELING DOWN, DEPRESSED OR HOPELESS: 0
SUM OF ALL RESPONSES TO PHQ QUESTIONS 1-9: 0
SUM OF ALL RESPONSES TO PHQ QUESTIONS 1-9: 3
SUM OF ALL RESPONSES TO PHQ QUESTIONS 1-9: 3
SUM OF ALL RESPONSES TO PHQ QUESTIONS 1-9: 0
7. TROUBLE CONCENTRATING ON THINGS, SUCH AS READING THE NEWSPAPER OR WATCHING TELEVISION: 1
9. THOUGHTS THAT YOU WOULD BE BETTER OFF DEAD, OR OF HURTING YOURSELF: 0
SUM OF ALL RESPONSES TO PHQ9 QUESTIONS 1 & 2: 0
SUM OF ALL RESPONSES TO PHQ QUESTIONS 1-9: 3
SUM OF ALL RESPONSES TO PHQ QUESTIONS 1-9: 0
8. MOVING OR SPEAKING SO SLOWLY THAT OTHER PEOPLE COULD HAVE NOTICED. OR THE OPPOSITE, BEING SO FIGETY OR RESTLESS THAT YOU HAVE BEEN MOVING AROUND A LOT MORE THAN USUAL: 0

## 2022-03-08 NOTE — PATIENT INSTRUCTIONS
Patient Education        Live Zoster (Shingles) Vaccine: What You Need to Know  Why get vaccinated? Live zoster (shingles) vaccine can prevent shingles. Shingles (also called herpes zoster, or just zoster) is a painful skin rash, usually with blisters. In addition to the rash, shingles can cause fever, headache, chills, or upset stomach. More rarely, shingles can lead to pneumonia, hearing problems, blindness, brain inflammation (encephalitis), or death. The most common complication of shingles is long-term nerve pain called postherpetic neuralgia (PHN). PHN occurs in the areas where the shingles rash was, even after the rash clears up. It can last for months or years after the rash goes away. The pain from PHN can be severe and debilitating. About 10 to 18% of people who get shingles will experience PHN. The risk of PHN increases with age. An older adult with shingles is more likely to develop PHN and have longer lasting and more severe pain than a younger person with shingles. Shingles is caused by the varicella zoster virus, the same virus that causes chickenpox. After you have chickenpox, the virus stays in your body and can cause shingles later in life. Shingles cannot be passed from one person to another, but the virus that causes shingles can spread and cause chickenpox in someone who had never had chickenpox or received chickenpox vaccine. Live shingles vaccine  Live shingles vaccine can provide protection against shingles and PHN. Another type of shingles vaccine, recombinant shingles vaccine, is the preferred vaccine for the prevention of shingles. However, live shingles vaccine may be used in some circumstances (for example if a person is allergic to recombinant shingles vaccine or prefers live shingles vaccine, or if recombinant shingles vaccine is not available). Adults 60 years and older who get live shingles vaccine should receive 1 dose, administered by injection.   Shingles vaccine may be given at the same time as other vaccines. Talk with your health care provider  Tell your vaccine provider if the person getting the vaccine:  · Has had an allergic reaction after a previous dose of live shingles vaccine or varicella vaccine, or has any severe, life-threatening allergies. · Has a weakened immune system. · Is pregnant or thinks she might be pregnant. · Is currently experiencing an episode of shingles. In some cases, your health care provider may decide to postpone shingles vaccination to a future visit. People with minor illnesses, such as a cold, may be vaccinated. People who are moderately or severely ill should usually wait until they recover before getting live shingles vaccine. Your health care provider can give you more information. Risks of a vaccine reaction  · Redness, soreness, swelling, or itching at the site of the injection and headache can happen after live shingles vaccine. Rarely, live shingles vaccine can cause rash or shingles. People sometimes faint after medical procedures, including vaccination. Tell your provider if you feel dizzy or have vision changes or ringing in the ears. As with any medicine, there is a very remote chance of a vaccine causing a severe allergic reaction, other serious injury, or death. What if there is a serious problem? An allergic reaction could occur after the vaccinated person leaves the clinic. If you see signs of a severe allergic reaction (hives, swelling of the face and throat, difficulty breathing, a fast heartbeat, dizziness, or weakness), call 9-1-1 and get the person to the nearest hospital.  For other signs that concern you, call your health care provider. Adverse reactions should be reported to the Vaccine Adverse Event Reporting System (VAERS). Your health care provider will usually file this report, or you can do it yourself. Visit the VAERS website at www.vaers. hhs.gov or call 7-386.991.4019. VAERS is only for reporting reactions, and [facility] staff do not give medical advice. How can I learn more? · Ask your health care provider. · Call your local or state health department. · Contact the Centers for Disease Control and Prevention (CDC):  ? Call 7-895.728.3851 (1-800-CDC-INFO) or  ? Visit CDC's website at www.cdc.gov/vaccines  Vaccine Information Statement  Live Zoster Vaccine  10/30/2019  [facility] and [facility] for Disease Control and Prevention  Many Vaccine Information Statements are available in [place] and other languages. See www.immunize.org/vis   Hojas de Información Sobre Vacunas están disponibles en Español y en muchos otros idiomas. Visite [place]oke.   Care instructions adapted under license by [facility] ([facility]). If you have questions about a medical condition or this instruction, always ask your healthcare professional. [place] [address] any warranty or liability for your use of this information. Patient Education        Shingles: Care Instructions  Your Care Instructions     Shingles (herpes zoster) causes pain and a blistered rash. The rash can appear anywhere on the body but will be on only one side of the body, the left or right. It will be in a band, a strip, or a small area. The pain can be very severe. Shingles can also cause tingling or itching in the area of the rash. The blisters scab over after a few days and heal in 2 to 4 weeks. Medicines can help you feel better and may help prevent more serious problems caused by shingles. Shingles is caused by the same virus that causes chickenpox. When you have chickenpox, the virus gets into your nerve roots and stays there (becomes dormant) long after you get over the chickenpox. If the virus becomes active again, it can cause shingles. Follow-up care is a key part of your treatment and safety. Be sure to make and go to all appointments, and call your doctor if you are having problems.  It's also a good idea to know your test results and keep a list of the medicines you take. How can you care for yourself at home? · Be safe with medicines. Take your medicines exactly as prescribed. Call your doctor if you think you are having a problem with your medicine. Antiviral medicine helps you get better faster. · Try not to scratch or pick at the blisters. They will crust over and fall off on their own if you leave them alone. · Put cool, wet cloths on the area to relieve pain and itching. You can also use calamine lotion. Try not to use so much lotion that it cakes and is hard to get off. · Put cornstarch or baking soda on the sores to help dry them out so they heal faster. · Do not use thick ointment, such as petroleum jelly, on the sores. This will keep them from drying and healing. · To help remove loose crusts, soak them in tap water. This can help decrease oozing, and dry and soothe the skin. · Take an over-the-counter pain medicine, such as acetaminophen (Tylenol), ibuprofen (Advil, Motrin), or naproxen (Aleve). Read and follow all instructions on the label. · Avoid close contact with people until the blisters have healed. It is very important for you to avoid contact with anyone who has never had chickenpox or the chickenpox vaccine. Pregnant women, young babies, and anyone else who has a hard time fighting infection (such as someone with HIV, diabetes, or cancer) is especially at risk. When should you call for help? Call your doctor now or seek immediate medical care if:    · You have a new or higher fever.     · You have a severe headache and a stiff neck.     · You lose the ability to think clearly.     · The rash spreads to your forehead, nose, eyes, or eyelids.     · You have eye pain, or your vision gets worse.     · You have new pain in your face, or you cannot move the muscles in your face.     · Blisters spread to new parts of your body.    Watch closely for changes in your health, and be sure to contact your doctor if:    · The rash has not healed after 2 to 4 weeks.     · You still have pain after the rash has healed. Where can you learn more? Go to https://[employer]Kon[employer]i. org and sign in to your [employer] account. Brandt Salomon in the [facility] box to learn more about \"Shingles: Care Instructions. \"     If you do not have an account, please click on the \"Sign Up Now\" link. Current as of: July 1, 2021               Content Version: 13.1  © 2006-2021 [facility]. Care instructions adapted under license by [employer] ([facility]). If you have questions about a medical condition or this instruction, always ask your healthcare professional. [address] any warranty or liability for your use of this information. Patient Education        Dermatomes: Anatomy Sketch    Current as of: March 3, 2021               Content Version: 13.1  © 2006-2021 [facility]. Care instructions adapted under license by [employer] ([facility]). If you have questions about a medical condition or this instruction, always ask your healthcare professional. [address] any warranty or liability for your use of this information.

## 2022-03-08 NOTE — PROGRESS NOTES
[facility] A department of [facility], [place] [address]  Phone: [phone]  Fax: [phone]      Branden Don is a 27 y.o. male who presents to the [facility] today for his medical conditions/complaints as noted below. Branden Don is c/o of Rash (Noticed 5 days ago. Left rib cage. Itchy. Painful. )          HPI:     Rash  This is a new problem. The current episode started in the past 7 days (5 days ago. ). The problem is unchanged. The affected locations include the torso (left posterior lateral chest wall). The rash is characterized by pain, redness and itchiness. Associated with: stress. Had viral sore in mouth a few weeks ago. Associated symptoms include fatigue (possibly new medication related). Pertinent negatives include no fever. Treatments tried: Auquaphor healing cream. Atarax for anxiety. The treatment provided mild relief. His past medical history is significant for varicella (as a child). Past Medical History:   Diagnosis Date    Acne     Allergic rhinitis     Anterior cruciate ligament tear     Left knee.  Bipolar disorder ([facility] [address]     Metacarpal bone fracture 2007    Right 3rd.  PTSD (post-traumatic stress disorder)     Traumatic injury 2009    Right arm laceration, median nerve laceration, brachial artery laceration with multiple surgeries. Allergies   Allergen Reactions    Penicillins Rash     Joy was. Patient unaware if he has had a reaction.  Bee Venom      Stings. Uses EpiPen.        Wt Readings from Last 3 Encounters:   03/08/22 161 lb (73 kg)   09/27/21 145 lb (65.8 kg)   09/15/21 135 lb (61.2 kg)     BP Readings from Last 3 Encounters:   09/27/21 120/70   09/15/21 106/72   12/03/19 (!) 115/91      Temp Readings from Last 3 Encounters:   03/08/22 96.9 °F (36.1 °C) (Tympanic)   12/03/19 98.5 °F (36.9 °C) (Oral)   11/09/18 98.7 °F (37.1 °C)     Pulse Readings from Last 3 Encounters:   03/08/22 72 09/27/21 84   09/15/21 86     SpO2 Readings from Last 3 Encounters:   03/08/22 98%   12/03/19 99%   11/10/18 100%       Subjective:      Review of Systems   Constitutional: Positive for fatigue (possibly new medication related). Negative for fever. Skin: Positive for rash. Psychiatric/Behavioral: Negative for confusion, decreased concentration, hallucinations and self-injury. Sp[place] [facility] [place] [facility] [place] 2 weeks ago. All other systems reviewed and are negative. Objective:     Vitals:    03/08/22 1708   Pulse: 72   Resp: 18   Temp: 96.9 °F (36.1 °C)   TempSrc: Tympanic   SpO2: 98%   Weight: 161 lb (73 kg)   Height: 5' 6\" (1.676 m)     Body mass index is 25.99 kg/m². Pulse 72   Temp 96.9 °F (36.1 °C) (Tympanic)   Resp 18   Ht 5' 6\" (1.676 m)   Wt 161 lb (73 kg)   SpO2 98%   BMI 25.99 kg/m²   Physical Exam  Vitals and nursing note reviewed. Exam conducted with a chaperone present. Constitutional:       General: He is not in acute distress. Appearance: He is well-developed. HENT:      Nose: Nose normal.      Mouth/Throat:      Mouth: Mucous membranes are moist.   Eyes:      Conjunctiva/sclera: Conjunctivae normal.      Pupils: Pupils are equal, round, and reactive to light. Neck:      Thyroid: No thyromegaly. Cardiovascular:      Rate and Rhythm: Normal rate and regular rhythm. Pulses: Normal pulses. Heart sounds: Normal heart sounds. No murmur heard. Pulmonary:      Effort: Pulmonary effort is normal. No respiratory distress. Breath sounds: Normal breath sounds. No wheezing or rales. Musculoskeletal:         General: Normal range of motion. Cervical back: Normal range of motion and neck supple. Lymphadenopathy:      Cervical: No cervical adenopathy. Skin:     General: Skin is warm and dry. Capillary Refill: Capillary refill takes less than 2 seconds. Findings: Rash present.  Rash is macular, papular, purpuric and urticarial. Neurological:      General: No focal deficit present. Mental Status: He is alert and oriented to person, place, and time. Mental status is at baseline. Cranial Nerves: No cranial nerve deficit. Sensory: No sensory deficit. Motor: No weakness. Gait: Gait normal.   Psychiatric:         Mood and Affect: Mood normal.         Behavior: Behavior normal.         Judgment: Judgment normal.         Assessment:      Diagnosis Orders   1. Shingles (herpes zoster) polyneuropathy  valACYclovir (VALTREX) 1 g tablet    lidocaine (LIDODERM) 5 %    naproxen (NAPROSYN) 500 MG tablet   2. Polyneuralgia due to herpes zoster  valACYclovir (VALTREX) 1 g tablet    lidocaine (LIDODERM) 5 %    naproxen (NAPROSYN) 500 MG tablet   3. Bipolar disorder, current episode manic severe with psychotic features ([facility]         Plan:   1. Shingles (herpes zoster) polyneuropathy  New  - valACYclovir (VALTREX) 1 g tablet; Take 1 tablet by mouth 3 times daily for 7 days  Dispense: 21 tablet; Refill: 0  - lidocaine (LIDODERM) 5 %; Place 1 patch onto the skin daily for 10 days 12 hours on, 12 hours off. Dispense: 10 patch; Refill: 0  - naproxen (NAPROSYN) 500 MG tablet; Take 1 tablet by mouth 2 times daily (with meals)  Dispense: 60 tablet; Refill: 0    2. Polyneuralgia due to herpes zoster  new  - valACYclovir (VALTREX) 1 g tablet; Take 1 tablet by mouth 3 times daily for 7 days  Dispense: 21 tablet; Refill: 0  - lidocaine (LIDODERM) 5 %; Place 1 patch onto the skin daily for 10 days 12 hours on, 12 hours off. Dispense: 10 patch; Refill: 0  - naproxen (NAPROSYN) 500 MG tablet; Take 1 tablet by mouth 2 times daily (with meals)  Dispense: 60 tablet; Refill: 0    3. Bipolar disorder, current episode manic severe with psychotic features ([facility] [address]  Stable on current medications. Follows up with outpatient psychiatry.        Discussed exam, POCT findings, plan of care (including prescriptive and supportive as listed below) and follow-up at length with patient. Reviewed all prescribed and recommended medications, administration and side effects. Encouraged to return to the clinic for no improvement and or worsening of symptoms. Patient instructed to go to ER or call 911 if any difficulty breathing, shortness of breath, inability to swallow, hives or temp greater than 103 degrees. All questions were answered and they verbalized understanding and were agreeable with the plan. Return if symptoms worsen or fail to improve.          Electronically signed by LING Bazan CNP on 3/8/2022 at 5:57 PM

## 2022-04-04 ENCOUNTER — TELEPHONE (OUTPATIENT)
Dept: FAMILY MEDICINE CLINIC | Age: 27
End: 2022-04-04

## 2022-04-04 NOTE — TELEPHONE ENCOUNTER
Attempted to reach patient regarding missed appointment on 4/4/22. Unable to contact at this time. Left message to reschedule.

## 2022-05-06 ENCOUNTER — OFFICE VISIT (OUTPATIENT)
Dept: PRIMARY CARE CLINIC | Age: 27
End: 2022-05-06
Payer: MEDICARE

## 2022-05-06 VITALS
DIASTOLIC BLOOD PRESSURE: 74 MMHG | WEIGHT: 164 LBS | BODY MASS INDEX: 26.47 KG/M2 | SYSTOLIC BLOOD PRESSURE: 112 MMHG | TEMPERATURE: 98.2 F | OXYGEN SATURATION: 100 % | HEART RATE: 74 BPM

## 2022-05-06 DIAGNOSIS — L25.9 CONTACT DERMATITIS, UNSPECIFIED CONTACT DERMATITIS TYPE, UNSPECIFIED TRIGGER: Primary | ICD-10-CM

## 2022-05-06 PROCEDURE — 1036F TOBACCO NON-USER: CPT | Performed by: NURSE PRACTITIONER

## 2022-05-06 PROCEDURE — 99212 OFFICE O/P EST SF 10 MIN: CPT | Performed by: NURSE PRACTITIONER

## 2022-05-06 PROCEDURE — 99213 OFFICE O/P EST LOW 20 MIN: CPT | Performed by: NURSE PRACTITIONER

## 2022-05-06 PROCEDURE — G8419 CALC BMI OUT NRM PARAM NOF/U: HCPCS | Performed by: NURSE PRACTITIONER

## 2022-05-06 PROCEDURE — G8427 DOCREV CUR MEDS BY ELIG CLIN: HCPCS | Performed by: NURSE PRACTITIONER

## 2022-05-06 RX ORDER — LITHIUM CARBONATE 450 MG
450 TABLET, EXTENDED RELEASE ORAL 2 TIMES DAILY
COMMUNITY

## 2022-05-06 RX ORDER — PREDNISONE 20 MG/1
TABLET ORAL
Qty: 24 TABLET | Refills: 0 | Status: SHIPPED | OUTPATIENT
Start: 2022-05-06

## 2022-05-06 ASSESSMENT — ENCOUNTER SYMPTOMS
SHORTNESS OF BREATH: 0
RHINORRHEA: 0
SORE THROAT: 0

## 2022-05-06 ASSESSMENT — PATIENT HEALTH QUESTIONNAIRE - PHQ9
SUM OF ALL RESPONSES TO PHQ QUESTIONS 1-9: 0
SUM OF ALL RESPONSES TO PHQ9 QUESTIONS 1 & 2: 0
2. FEELING DOWN, DEPRESSED OR HOPELESS: 0
1. LITTLE INTEREST OR PLEASURE IN DOING THINGS: 0
SUM OF ALL RESPONSES TO PHQ QUESTIONS 1-9: 0

## 2022-05-06 NOTE — PROGRESS NOTES
Subjective:      Patient ID: Jalyn Miramontes is a 27 y.o. male coming in for   Chief Complaint   Patient presents with    Rash     hands, face         Rash  This is a new problem. Episode onset: ongoing for 3-4 weeks. The problem has been gradually worsening since onset. The rash is diffuse (face, arms, hands). The rash is characterized by redness and itchiness (mild raised areas). Associated with: denies anything new he may have been exposed to. Pertinent negatives include no facial edema, fatigue, fever, rhinorrhea, shortness of breath or sore throat. Past treatments include antihistamine and anti-itch cream. The treatment provided no relief. Review of Systems   Constitutional: Negative for fatigue and fever. HENT: Negative for rhinorrhea and sore throat. Respiratory: Negative for shortness of breath. Endocrine: Positive for polyuria. Skin: Positive for rash. Psychiatric/Behavioral: The patient is nervous/anxious and is hyperactive. All other systems reviewed and are negative. Objective:  /74 (Site: Left Upper Arm, Position: Sitting, Cuff Size: Large Adult)   Pulse 74   Temp 98.2 °F (36.8 °C) (Tympanic)   Wt 164 lb (74.4 kg)   SpO2 100%   BMI 26.47 kg/m²      Physical Exam  Vitals and nursing note reviewed. Constitutional:       Appearance: Normal appearance. HENT:      Head: Normocephalic. Pulmonary:      Effort: Pulmonary effort is normal.   Musculoskeletal:      Cervical back: Normal range of motion. Skin:     Findings: Rash present. Rash is papular (to face, arms, hands). Neurological:      General: No focal deficit present. Mental Status: He is alert and oriented to person, place, and time. Assessment:      1. Contact dermatitis, unspecified contact dermatitis type, unspecified trigger           Plan:   -tapered high dose prednisone given  -pt to call office to f/u with me one steroids are complete.       No orders of the defined types were placed in this encounter. Outpatient Encounter Medications as of 5/6/2022   Medication Sig Dispense Refill    lithium (ESKALITH) 450 MG extended release tablet Take 450 mg by mouth 2 times daily      predniSONE (DELTASONE) 20 MG tablet 60mg daily x 4 days, 40mg daily x 4 days, 20mg daily x 4 days. 24 tablet 0    hydrOXYzine (ATARAX) 25 MG tablet TAKE 1 TABLET BY MOUTH THREE TIMES DAILY AS NEEDED      [DISCONTINUED] carBAMazepine (TEGRETOL) 200 MG tablet TAKE 1 TABLET BY MOUTH TWICE DAILY (Patient not taking: Reported on 5/6/2022)      [DISCONTINUED] QUEtiapine (SEROQUEL) 100 MG tablet TAKE 1 TABLET BY MOUTH ONCE DAILY IN THE MORNING BEFORE NOON      [DISCONTINUED] QUEtiapine (SEROQUEL) 300 MG tablet TAKE 1 TABLET BY MOUTH ONCE DAILY AT NIGHT (Patient not taking: Reported on 5/6/2022)      [DISCONTINUED] OLANZapine (ZYPREXA) 10 MG tablet TAKE 1 TABLET BY MOUTH ONCE DAILY AT NIGHT AS NEEDED      [DISCONTINUED] naproxen (NAPROSYN) 500 MG tablet Take 1 tablet by mouth 2 times daily (with meals) 60 tablet 0     No facility-administered encounter medications on file as of 5/6/2022.             Arminda Mar, APRN - CNP

## 2023-04-17 ENCOUNTER — HOSPITAL ENCOUNTER (OUTPATIENT)
Age: 28
Discharge: HOME | End: 2023-04-17
Payer: MEDICAID

## 2023-04-17 ENCOUNTER — HOSPITAL ENCOUNTER (OUTPATIENT)
Dept: CT IMAGING | Age: 28
Discharge: HOME | End: 2023-04-19
Payer: MEDICAID

## 2023-04-17 ENCOUNTER — OFFICE VISIT (OUTPATIENT)
Dept: PRIMARY CARE CLINIC | Age: 28
End: 2023-04-17
Payer: MEDICAID

## 2023-04-17 VITALS
BODY MASS INDEX: 22.72 KG/M2 | SYSTOLIC BLOOD PRESSURE: 122 MMHG | RESPIRATION RATE: 18 BRPM | HEART RATE: 59 BPM | DIASTOLIC BLOOD PRESSURE: 66 MMHG | WEIGHT: 141.38 LBS | OXYGEN SATURATION: 99 % | HEIGHT: 66 IN | TEMPERATURE: 97.8 F

## 2023-04-17 DIAGNOSIS — R10.11 RIGHT UPPER QUADRANT ABDOMINAL PAIN: ICD-10-CM

## 2023-04-17 DIAGNOSIS — E87.6 HYPOKALEMIA: Primary | ICD-10-CM

## 2023-04-17 DIAGNOSIS — B71.9 TAPEWORM INFECTION: ICD-10-CM

## 2023-04-17 LAB
ABSOLUTE EOS #: 0.03 K/UL (ref 0–0.44)
ABSOLUTE IMMATURE GRANULOCYTE: <0.03 K/UL (ref 0–0.3)
ABSOLUTE LYMPH #: 1.3 K/UL (ref 1.1–3.7)
ABSOLUTE MONO #: 0.46 K/UL (ref 0.1–1.2)
ALBUMIN SERPL-MCNC: 4.9 G/DL (ref 3.5–5.2)
ALBUMIN/GLOBULIN RATIO: 2 (ref 1–2.5)
ALP SERPL-CCNC: 66 U/L (ref 40–129)
ALT SERPL-CCNC: <5 U/L (ref 5–41)
ANION GAP SERPL CALCULATED.3IONS-SCNC: 10 MMOL/L (ref 9–17)
AST SERPL-CCNC: 15 U/L
BASOPHILS # BLD: 1 % (ref 0–2)
BASOPHILS ABSOLUTE: 0.04 K/UL (ref 0–0.2)
BILIRUB SERPL-MCNC: 0.5 MG/DL (ref 0.3–1.2)
BUN SERPL-MCNC: 15 MG/DL (ref 6–20)
BUN/CREAT BLD: 13 (ref 9–20)
CALCIUM SERPL-MCNC: 9.7 MG/DL (ref 8.6–10.4)
CHLORIDE SERPL-SCNC: 102 MMOL/L (ref 98–107)
CO2 SERPL-SCNC: 27 MMOL/L (ref 20–31)
CREAT SERPL-MCNC: 1.18 MG/DL (ref 0.7–1.2)
EOSINOPHILS RELATIVE PERCENT: 1 % (ref 1–4)
GFR SERPL CREATININE-BSD FRML MDRD: >60 ML/MIN/1.73M2
GLUCOSE SERPL-MCNC: 131 MG/DL (ref 70–99)
HCT VFR BLD AUTO: 43.7 % (ref 40.7–50.3)
HGB BLD-MCNC: 14 G/DL (ref 13–17)
IMMATURE GRANULOCYTES: 0 %
LITHIUM DATE LAST DOSE: NORMAL
LITHIUM DOSE TIME: 1030
LITHIUM LEVEL: 1.1 MMOL/L (ref 0.6–1.2)
LYMPHOCYTES # BLD: 25 % (ref 24–43)
MCH RBC QN AUTO: 26.8 PG (ref 25.2–33.5)
MCHC RBC AUTO-ENTMCNC: 32 G/DL (ref 25.2–33.5)
MCV RBC AUTO: 83.7 FL (ref 82.6–102.9)
MONOCYTES # BLD: 9 % (ref 3–12)
NRBC AUTOMATED: 0 PER 100 WBC
PDW BLD-RTO: 13.6 % (ref 11.8–14.4)
PLATELET # BLD AUTO: 285 K/UL (ref 138–453)
PMV BLD AUTO: 10 FL (ref 8.1–13.5)
POTASSIUM SERPL-SCNC: 3.3 MMOL/L (ref 3.7–5.3)
PROT SERPL-MCNC: 7.4 G/DL (ref 6.4–8.3)
RBC # BLD: 5.22 M/UL (ref 4.21–5.77)
SEG NEUTROPHILS: 64 % (ref 36–65)
SEGMENTED NEUTROPHILS ABSOLUTE COUNT: 3.44 K/UL (ref 1.5–8.1)
SODIUM SERPL-SCNC: 139 MMOL/L (ref 135–144)
TSH SERPL-ACNC: 3.74 UIU/ML (ref 0.3–5)
WBC # BLD AUTO: 5.3 K/UL (ref 3.5–11.3)

## 2023-04-17 PROCEDURE — 84480 ASSAY TRIIODOTHYRONINE (T3): CPT

## 2023-04-17 PROCEDURE — 99212 OFFICE O/P EST SF 10 MIN: CPT

## 2023-04-17 PROCEDURE — 80053 COMPREHEN METABOLIC PANEL: CPT

## 2023-04-17 PROCEDURE — 84436 ASSAY OF TOTAL THYROXINE: CPT

## 2023-04-17 PROCEDURE — 6360000004 HC RX CONTRAST MEDICATION

## 2023-04-17 PROCEDURE — 84443 ASSAY THYROID STIM HORMONE: CPT

## 2023-04-17 PROCEDURE — 80178 ASSAY OF LITHIUM: CPT

## 2023-04-17 PROCEDURE — 36415 COLL VENOUS BLD VENIPUNCTURE: CPT

## 2023-04-17 PROCEDURE — 85025 COMPLETE CBC W/AUTO DIFF WBC: CPT

## 2023-04-17 PROCEDURE — 2709999900 CT ABDOMEN PELVIS W IV CONTRAST

## 2023-04-17 RX ORDER — POTASSIUM CHLORIDE 750 MG/1
10 TABLET, EXTENDED RELEASE ORAL 3 TIMES DAILY
Qty: 180 TABLET | Refills: 1 | Status: SHIPPED | OUTPATIENT
Start: 2023-04-17

## 2023-04-17 RX ORDER — PRAZIQUANTEL 600 MG/1
1200 TABLET, FILM COATED ORAL ONCE
Qty: 2 TABLET | Refills: 0 | Status: SHIPPED | OUTPATIENT
Start: 2023-04-17 | End: 2023-04-17

## 2023-04-17 RX ORDER — HYDROXYZINE HYDROCHLORIDE 10 MG/1
10 TABLET, FILM COATED ORAL 3 TIMES DAILY PRN
COMMUNITY

## 2023-04-17 RX ADMIN — IOPAMIDOL 100 ML: 755 INJECTION, SOLUTION INTRAVENOUS at 12:56

## 2023-04-17 ASSESSMENT — ENCOUNTER SYMPTOMS
DIARRHEA: 1
ABDOMINAL PAIN: 1
VOMITING: 0
SHORTNESS OF BREATH: 0
BLOOD IN STOOL: 0
NAUSEA: 1
CONSTIPATION: 0

## 2023-04-17 NOTE — PROGRESS NOTES
flat. Bowel sounds are normal.      Palpations: Abdomen is soft. Tenderness: There is abdominal tenderness in the right upper quadrant. There is no right CVA tenderness, left CVA tenderness or rebound. Musculoskeletal:         General: No swelling. Cervical back: Neck supple. Neurological:      General: No focal deficit present. Mental Status: He is alert and oriented to person, place, and time. Psychiatric:         Mood and Affect: Mood normal.         Behavior: Behavior normal.       Assessment and Plan      Diagnosis Orders   1. Hypokalemia  potassium chloride (KLOR-CON M) 10 MEQ extended release tablet      2. Tapeworm infection  praziquantel (BILTRICIDE) 600 MG tablet      3. Right upper quadrant abdominal pain  Miscellaneous Sendout    CBC with Auto Differential    Comprehensive Metabolic Panel    CT ABDOMEN PELVIS W IV CONTRAST Additional Contrast? None        Orders Placed This Encounter    CT ABDOMEN PELVIS W IV CONTRAST Additional Contrast? None     Standing Status:   Future     Number of Occurrences:   1     Standing Expiration Date:   4/17/2024     Order Specific Question:   Additional Contrast?     Answer:   None     Order Specific Question:   STAT Creatinine as needed:     Answer:   No     Order Specific Question:   Reason for exam:     Answer:   RUQ pain, nausea, diarrhea    Miscellaneous Sendout     Standing Status:   Future     Standing Expiration Date:   5/17/2023     Order Specific Question:   Specify Req.  Test (1 Test/Order)     Answer:   OVA parasite, test for tapeworm, associated with travel    CBC with Auto Differential     Standing Status:   Future     Number of Occurrences:   1     Standing Expiration Date:   5/17/2023    Comprehensive Metabolic Panel     Standing Status:   Future     Number of Occurrences:   1     Standing Expiration Date:   4/17/2024    hydrOXYzine HCl (ATARAX) 10 MG tablet     Sig: Take 1 tablet by mouth 3 times daily as needed for Itching

## 2023-04-18 ENCOUNTER — HOSPITAL ENCOUNTER (OUTPATIENT)
Age: 28
Setting detail: SPECIMEN
Discharge: HOME | End: 2023-04-18
Payer: MEDICAID

## 2023-04-18 PROCEDURE — 87015 SPECIMEN INFECT AGNT CONCNTJ: CPT

## 2023-04-18 PROCEDURE — 87210 SMEAR WET MOUNT SALINE/INK: CPT

## 2023-04-18 PROCEDURE — 87209 SMEAR COMPLEX STAIN: CPT

## 2023-04-19 DIAGNOSIS — R10.11 RIGHT UPPER QUADRANT ABDOMINAL PAIN: ICD-10-CM

## 2023-04-19 LAB
MISCELLANEOUS LAB TEST RESULT: NORMAL
T3 SERPL-MCNC: 96 NG/DL (ref 60–181)
T4 SERPL-MCNC: 7.5 UG/DL (ref 4.5–10.9)
TEST NAME: NORMAL

## 2023-04-20 LAB
O+P STL CONC: NORMAL
SPECIMEN DESCRIPTION: NORMAL

## 2023-08-09 DIAGNOSIS — E87.6 HYPOKALEMIA: ICD-10-CM

## 2023-08-09 RX ORDER — POTASSIUM CHLORIDE 750 MG/1
TABLET, EXTENDED RELEASE ORAL
Qty: 180 TABLET | Refills: 0 | OUTPATIENT
Start: 2023-08-09

## 2023-09-01 DIAGNOSIS — E87.6 HYPOKALEMIA: ICD-10-CM

## 2023-09-01 RX ORDER — POTASSIUM CHLORIDE 750 MG/1
TABLET, EXTENDED RELEASE ORAL
Qty: 180 TABLET | Refills: 0 | OUTPATIENT
Start: 2023-09-01

## 2024-02-12 ENCOUNTER — HOSPITAL ENCOUNTER (OUTPATIENT)
Age: 29
Discharge: HOME | End: 2024-02-12
Payer: MEDICAID

## 2024-02-12 LAB
LITHIUM LEVEL: 1 MMOL/L (ref 0.6–1.2)
TSH SERPL DL<=0.05 MIU/L-ACNC: 1.51 UIU/ML (ref 0.3–5)

## 2024-02-12 PROCEDURE — 84443 ASSAY THYROID STIM HORMONE: CPT

## 2024-02-12 PROCEDURE — 36415 COLL VENOUS BLD VENIPUNCTURE: CPT

## 2024-02-12 PROCEDURE — 84480 ASSAY TRIIODOTHYRONINE (T3): CPT

## 2024-02-12 PROCEDURE — 84436 ASSAY OF TOTAL THYROXINE: CPT

## 2024-02-12 PROCEDURE — 80178 ASSAY OF LITHIUM: CPT

## 2024-02-13 LAB
T3 SERPL-MCNC: 112 NG/DL (ref 80–200)
T4 SERPL-MCNC: 7.4 UG/DL (ref 4.5–11.7)

## 2024-02-14 LAB
MISCELLANEOUS LAB TEST RESULT: NORMAL
TEST NAME: NORMAL

## 2024-12-02 ENCOUNTER — HOSPITAL ENCOUNTER (OUTPATIENT)
Age: 29
Discharge: HOME | End: 2024-12-02
Payer: MEDICAID

## 2024-12-02 LAB — LITHIUM LEVEL: 0.7 MMOL/L (ref 0.6–1.2)

## 2024-12-02 PROCEDURE — 80178 ASSAY OF LITHIUM: CPT

## 2024-12-02 PROCEDURE — 36415 COLL VENOUS BLD VENIPUNCTURE: CPT

## 2024-12-26 ENCOUNTER — HOSPITAL ENCOUNTER (EMERGENCY)
Age: 29
Discharge: HOME | End: 2024-12-26
Attending: SPECIALIST
Payer: MEDICAID

## 2024-12-26 ENCOUNTER — APPOINTMENT (OUTPATIENT)
Dept: GENERAL RADIOLOGY | Age: 29
End: 2024-12-26
Payer: MEDICAID

## 2024-12-26 VITALS
TEMPERATURE: 100.8 F | BODY MASS INDEX: 22.9 KG/M2 | WEIGHT: 160 LBS | SYSTOLIC BLOOD PRESSURE: 138 MMHG | DIASTOLIC BLOOD PRESSURE: 68 MMHG | HEIGHT: 70 IN | OXYGEN SATURATION: 98 % | HEART RATE: 98 BPM | RESPIRATION RATE: 16 BRPM

## 2024-12-26 DIAGNOSIS — R50.9 FEVER, UNSPECIFIED FEVER CAUSE: Primary | ICD-10-CM

## 2024-12-26 LAB
FLUAV AG SPEC QL: NEGATIVE
FLUBV AG SPEC QL: NEGATIVE
SARS-COV-2 RDRP RESP QL NAA+PROBE: NOT DETECTED
SPECIMEN DESCRIPTION: NORMAL

## 2024-12-26 PROCEDURE — 99284 EMERGENCY DEPT VISIT MOD MDM: CPT

## 2024-12-26 PROCEDURE — 6370000000 HC RX 637 (ALT 250 FOR IP): Performed by: SPECIALIST

## 2024-12-26 PROCEDURE — 87635 SARS-COV-2 COVID-19 AMP PRB: CPT

## 2024-12-26 PROCEDURE — 87804 INFLUENZA ASSAY W/OPTIC: CPT

## 2024-12-26 PROCEDURE — 71046 X-RAY EXAM CHEST 2 VIEWS: CPT

## 2024-12-26 RX ORDER — IBUPROFEN 200 MG
400 TABLET ORAL ONCE
Status: COMPLETED | OUTPATIENT
Start: 2024-12-26 | End: 2024-12-26

## 2024-12-26 RX ADMIN — IBUPROFEN 400 MG: 200 TABLET, FILM COATED ORAL at 21:35

## 2024-12-26 ASSESSMENT — PAIN SCALES - GENERAL: PAINLEVEL_OUTOF10: 3

## 2024-12-26 ASSESSMENT — LIFESTYLE VARIABLES
HOW MANY STANDARD DRINKS CONTAINING ALCOHOL DO YOU HAVE ON A TYPICAL DAY: PATIENT DOES NOT DRINK
HOW OFTEN DO YOU HAVE A DRINK CONTAINING ALCOHOL: NEVER

## 2024-12-26 ASSESSMENT — PAIN - FUNCTIONAL ASSESSMENT: PAIN_FUNCTIONAL_ASSESSMENT: 0-10

## 2024-12-26 ASSESSMENT — PAIN DESCRIPTION - LOCATION: LOCATION: CHEST

## 2024-12-27 ASSESSMENT — ENCOUNTER SYMPTOMS
DIARRHEA: 0
WHEEZING: 0
COUGH: 1
ABDOMINAL PAIN: 0
SHORTNESS OF BREATH: 0
VOMITING: 0
SORE THROAT: 0

## 2024-12-27 NOTE — ED PROVIDER NOTES
COVID swab and rapid influenza antigens are negative    EMERGENCY DEPARTMENT COURSE:   Vitals:    Vitals:    12/26/24 2051 12/26/24 2228   BP: 138/68    Pulse: (!) 110 98   Resp: 16    Temp: (!) 102.6 °F (39.2 °C) (!) 100.8 °F (38.2 °C)   SpO2: 98%    Weight: 72.6 kg (160 lb)    Height: 1.778 m (5' 10\")      -------------------------  BP: 138/68, Temp: (!) 100.8 °F (38.2 °C), Pulse: 98, Respirations: 16    Orders Placed This Encounter   Medications    ibuprofen (ADVIL;MOTRIN) tablet 400 mg       During the emergency department course, patient was given Motrin 400 mg orally and his temperature came down to 100.8 °F prior to discharge.  He is feeling better and resting comfortably.  His chest x-ray and COVID and flu swabs are negative.  His oropharynx is clear and tympanic membranes are clear.  His abdomen is soft and nontender with active bowel sounds and he denies any urinary symptoms.  He does not have any skin rash, arthralgia, arthritis and does not have any meningeal signs.  He appears to have viral illness.  Plan is to discharge the patient with instructions drink plenty of oral fluids, take Tylenol and/or ibuprofen as needed for the fever or pain, dosing guidelines were given, follow-up with PCP, return anytime for worsening symptoms or any new symptoms.  Patient is advised to call us if any questions, concerns or problems    I have reviewed the disposition diagnosis with the patient and or their family/guardian. I have answered their questions and given discharge instructions. They voiced understanding of these instructions and did not have any further questions or complaints.    Re-evaluation Notes    Patient is resting comfortably and does not appear to be in any pain or distress prior to discharge    CRITICAL CARE:   None        CONSULTS:      PROCEDURES:  None    FINAL IMPRESSION      1. Fever, unspecified fever cause          DISPOSITION/PLAN   DISPOSITION Decision To Discharge    Condition on

## 2024-12-27 NOTE — DISCHARGE INSTRUCTIONS
Please understand that at this time there is no evidence for a more serious underlying process, but that early in the process of an illness or injury, an emergency department workup can be falsely reassuring.  You should contact your family doctor within the next 48 hours for a follow up appointment    THANK YOU!!!    From [facility]    On behalf of the Emergency Department staff at [facility], I would like to thank you for giving us the opportunity to address your health care needs and concerns.    We hope that during your visit, our service was delivered in a professional and caring manner. Please keep [facility] in mind as we walk with you down the path to your own personal wellness.     Please expect an automated text message or email from us so we can ask a few questions about your health and progress. Based on your answers, a clinician may call you back to offer help and instructions.    Please understand that early in the process of an illness or injury, an emergency department workup can be falsely reassuring.  If you notice any worsening, changing or persistent symptoms please call your family doctor or return to the ER immediately.     Tell us how we did during your visit at [email or web address]   and let us know about your experience

## 2025-04-13 ENCOUNTER — HOSPITAL ENCOUNTER (OUTPATIENT)
Dept: GENERAL RADIOLOGY | Age: 30
Discharge: HOME | End: 2025-04-15
Payer: MEDICAID

## 2025-04-13 ENCOUNTER — HOSPITAL ENCOUNTER (OUTPATIENT)
Age: 30
Discharge: HOME | End: 2025-04-15
Payer: MEDICAID

## 2025-04-13 ENCOUNTER — OFFICE VISIT (OUTPATIENT)
Dept: PRIMARY CARE CLINIC | Age: 30
End: 2025-04-13
Payer: MEDICAID

## 2025-04-13 VITALS
OXYGEN SATURATION: 98 % | WEIGHT: 169 LBS | HEART RATE: 83 BPM | RESPIRATION RATE: 20 BRPM | SYSTOLIC BLOOD PRESSURE: 124 MMHG | BODY MASS INDEX: 24.25 KG/M2 | DIASTOLIC BLOOD PRESSURE: 72 MMHG

## 2025-04-13 DIAGNOSIS — S63.501A SPRAIN OF RIGHT WRIST, INITIAL ENCOUNTER: ICD-10-CM

## 2025-04-13 DIAGNOSIS — R52 ACUTE PAIN: ICD-10-CM

## 2025-04-13 DIAGNOSIS — R52 ACUTE PAIN: Primary | ICD-10-CM

## 2025-04-13 PROCEDURE — 73110 X-RAY EXAM OF WRIST: CPT

## 2025-05-22 ENCOUNTER — HOSPITAL ENCOUNTER (OUTPATIENT)
Age: 30
Discharge: HOME | End: 2025-05-22
Payer: MEDICAID

## 2025-05-22 LAB
LITHIUM DATE LAST DOSE: ABNORMAL
LITHIUM DOSE AMOUNT: 450
LITHIUM DOSE TIME: 2100
LITHIUM LEVEL: 0.5 MMOL/L (ref 0.6–1.2)

## 2025-05-22 PROCEDURE — 80178 ASSAY OF LITHIUM: CPT

## 2025-05-22 PROCEDURE — 36415 COLL VENOUS BLD VENIPUNCTURE: CPT
